# Patient Record
Sex: MALE | Race: WHITE | Employment: UNEMPLOYED | URBAN - METROPOLITAN AREA
[De-identification: names, ages, dates, MRNs, and addresses within clinical notes are randomized per-mention and may not be internally consistent; named-entity substitution may affect disease eponyms.]

---

## 2017-01-09 RX ORDER — PREDNISONE 10 MG/1
TABLET ORAL
Qty: 30 TABLET | Refills: 0 | Status: SHIPPED | OUTPATIENT
Start: 2017-01-09 | End: 2017-10-03

## 2017-01-23 ENCOUNTER — OFFICE VISIT (OUTPATIENT)
Dept: RHEUMATOLOGY | Age: 62
End: 2017-01-23

## 2017-01-23 VITALS
SYSTOLIC BLOOD PRESSURE: 124 MMHG | DIASTOLIC BLOOD PRESSURE: 86 MMHG | BODY MASS INDEX: 34.79 KG/M2 | HEIGHT: 70 IN | WEIGHT: 243 LBS | TEMPERATURE: 98.7 F | HEART RATE: 84 BPM

## 2017-01-23 DIAGNOSIS — M17.0 PRIMARY OSTEOARTHRITIS OF BOTH KNEES: ICD-10-CM

## 2017-01-23 DIAGNOSIS — Z79.899 HIGH RISK MEDICATION USE: ICD-10-CM

## 2017-01-23 DIAGNOSIS — L40.50 PSORIATIC ARTHRITIS (HCC): Primary | ICD-10-CM

## 2017-01-23 DIAGNOSIS — Z51.11 MAINTENANCE CHEMOTHERAPY: ICD-10-CM

## 2017-01-23 DIAGNOSIS — L40.9 PSORIASIS: ICD-10-CM

## 2017-01-23 LAB
A/G RATIO: 1.9 (ref 1.1–2.2)
ALBUMIN SERPL-MCNC: 4.4 G/DL (ref 3.4–5)
ALP BLD-CCNC: 57 U/L (ref 40–129)
ALT SERPL-CCNC: 21 U/L (ref 10–40)
ANION GAP SERPL CALCULATED.3IONS-SCNC: 15 MMOL/L (ref 3–16)
ANISOCYTOSIS: ABNORMAL
AST SERPL-CCNC: 14 U/L (ref 15–37)
BASOPHILS ABSOLUTE: 0.1 K/UL (ref 0–0.2)
BASOPHILS RELATIVE PERCENT: 1 %
BILIRUB SERPL-MCNC: 0.3 MG/DL (ref 0–1)
BUN BLDV-MCNC: 12 MG/DL (ref 7–20)
C-REACTIVE PROTEIN: 3.5 MG/L (ref 0–5.1)
CALCIUM SERPL-MCNC: 9.4 MG/DL (ref 8.3–10.6)
CHLORIDE BLD-SCNC: 101 MMOL/L (ref 99–110)
CO2: 27 MMOL/L (ref 21–32)
CREAT SERPL-MCNC: 0.8 MG/DL (ref 0.9–1.3)
EOSINOPHILS ABSOLUTE: 0.1 K/UL (ref 0–0.6)
EOSINOPHILS RELATIVE PERCENT: 1.7 %
GFR AFRICAN AMERICAN: >60
GFR NON-AFRICAN AMERICAN: >60
GLOBULIN: 2.3 G/DL
GLUCOSE BLD-MCNC: 91 MG/DL (ref 70–99)
HCT VFR BLD CALC: 48.1 % (ref 40.5–52.5)
HEMOGLOBIN: 15.7 G/DL (ref 13.5–17.5)
LYMPHOCYTES ABSOLUTE: 1.4 K/UL (ref 1–5.1)
LYMPHOCYTES RELATIVE PERCENT: 23.2 %
MCH RBC QN AUTO: 28.1 PG (ref 26–34)
MCHC RBC AUTO-ENTMCNC: 32.5 G/DL (ref 31–36)
MCV RBC AUTO: 86.4 FL (ref 80–100)
MONOCYTES ABSOLUTE: 0.6 K/UL (ref 0–1.3)
MONOCYTES RELATIVE PERCENT: 9.5 %
NEUTROPHILS ABSOLUTE: 4 K/UL (ref 1.7–7.7)
NEUTROPHILS RELATIVE PERCENT: 64.6 %
PDW BLD-RTO: 20.3 % (ref 12.4–15.4)
PLATELET # BLD: 175 K/UL (ref 135–450)
PLATELET SLIDE REVIEW: ADEQUATE
PMV BLD AUTO: 9 FL (ref 5–10.5)
POTASSIUM SERPL-SCNC: 5.1 MMOL/L (ref 3.5–5.1)
RBC # BLD: 5.57 M/UL (ref 4.2–5.9)
SEDIMENTATION RATE, ERYTHROCYTE: 8 MM/HR (ref 0–20)
SLIDE REVIEW: ABNORMAL
SODIUM BLD-SCNC: 143 MMOL/L (ref 136–145)
TOTAL PROTEIN: 6.7 G/DL (ref 6.4–8.2)
WBC # BLD: 6.2 K/UL (ref 4–11)

## 2017-01-23 PROCEDURE — 99214 OFFICE O/P EST MOD 30 MIN: CPT | Performed by: INTERNAL MEDICINE

## 2017-03-13 ENCOUNTER — TELEPHONE (OUTPATIENT)
Dept: RHEUMATOLOGY | Age: 62
End: 2017-03-13

## 2017-03-14 ENCOUNTER — TELEPHONE (OUTPATIENT)
Dept: RHEUMATOLOGY | Age: 62
End: 2017-03-14

## 2017-03-14 RX ORDER — PREDNISONE 10 MG/1
TABLET ORAL
Qty: 25 TABLET | Refills: 0 | Status: SHIPPED | OUTPATIENT
Start: 2017-03-14 | End: 2017-03-20

## 2017-03-20 ENCOUNTER — OFFICE VISIT (OUTPATIENT)
Dept: RHEUMATOLOGY | Age: 62
End: 2017-03-20

## 2017-03-20 VITALS
TEMPERATURE: 97.7 F | BODY MASS INDEX: 36.08 KG/M2 | HEIGHT: 70 IN | SYSTOLIC BLOOD PRESSURE: 134 MMHG | HEART RATE: 84 BPM | WEIGHT: 252 LBS | DIASTOLIC BLOOD PRESSURE: 86 MMHG

## 2017-03-20 DIAGNOSIS — L40.9 PSORIASIS: ICD-10-CM

## 2017-03-20 DIAGNOSIS — Z51.11 MAINTENANCE CHEMOTHERAPY: ICD-10-CM

## 2017-03-20 DIAGNOSIS — L40.50 PSORIATIC ARTHRITIS (HCC): Primary | ICD-10-CM

## 2017-03-20 LAB
A/G RATIO: 2 (ref 1.1–2.2)
ALBUMIN SERPL-MCNC: 4.3 G/DL (ref 3.4–5)
ALP BLD-CCNC: 62 U/L (ref 40–129)
ALT SERPL-CCNC: 48 U/L (ref 10–40)
ANION GAP SERPL CALCULATED.3IONS-SCNC: 18 MMOL/L (ref 3–16)
AST SERPL-CCNC: 23 U/L (ref 15–37)
BASOPHILS ABSOLUTE: 0.1 K/UL (ref 0–0.2)
BASOPHILS RELATIVE PERCENT: 1.2 %
BILIRUB SERPL-MCNC: 0.5 MG/DL (ref 0–1)
BUN BLDV-MCNC: 11 MG/DL (ref 7–20)
C-REACTIVE PROTEIN: 7.1 MG/L (ref 0–5.1)
CALCIUM SERPL-MCNC: 9.6 MG/DL (ref 8.3–10.6)
CHLORIDE BLD-SCNC: 103 MMOL/L (ref 99–110)
CO2: 23 MMOL/L (ref 21–32)
CREAT SERPL-MCNC: 0.7 MG/DL (ref 0.9–1.3)
EOSINOPHILS ABSOLUTE: 0.2 K/UL (ref 0–0.6)
EOSINOPHILS RELATIVE PERCENT: 1.8 %
GFR AFRICAN AMERICAN: >60
GFR NON-AFRICAN AMERICAN: >60
GLOBULIN: 2.1 G/DL
GLUCOSE BLD-MCNC: 98 MG/DL (ref 70–99)
HCT VFR BLD CALC: 49.4 % (ref 40.5–52.5)
HEMOGLOBIN: 16 G/DL (ref 13.5–17.5)
LYMPHOCYTES ABSOLUTE: 1.4 K/UL (ref 1–5.1)
LYMPHOCYTES RELATIVE PERCENT: 15.4 %
MCH RBC QN AUTO: 30.3 PG (ref 26–34)
MCHC RBC AUTO-ENTMCNC: 32.4 G/DL (ref 31–36)
MCV RBC AUTO: 93.5 FL (ref 80–100)
MONOCYTES ABSOLUTE: 0.8 K/UL (ref 0–1.3)
MONOCYTES RELATIVE PERCENT: 8.9 %
NEUTROPHILS ABSOLUTE: 6.6 K/UL (ref 1.7–7.7)
NEUTROPHILS RELATIVE PERCENT: 72.7 %
PDW BLD-RTO: 17.3 % (ref 12.4–15.4)
PLATELET # BLD: 209 K/UL (ref 135–450)
PMV BLD AUTO: 9.6 FL (ref 5–10.5)
POTASSIUM SERPL-SCNC: 4.4 MMOL/L (ref 3.5–5.1)
RBC # BLD: 5.28 M/UL (ref 4.2–5.9)
SEDIMENTATION RATE, ERYTHROCYTE: 5 MM/HR (ref 0–20)
SODIUM BLD-SCNC: 144 MMOL/L (ref 136–145)
TOTAL PROTEIN: 6.4 G/DL (ref 6.4–8.2)
WBC # BLD: 9.1 K/UL (ref 4–11)

## 2017-03-20 PROCEDURE — 99214 OFFICE O/P EST MOD 30 MIN: CPT | Performed by: INTERNAL MEDICINE

## 2017-03-20 RX ORDER — PREDNISONE 10 MG/1
TABLET ORAL
Qty: 30 TABLET | Refills: 1 | Status: SHIPPED | OUTPATIENT
Start: 2017-03-20 | End: 2017-03-30 | Stop reason: SDUPTHER

## 2017-03-30 RX ORDER — FOLIC ACID 1 MG/1
1 TABLET ORAL DAILY
Qty: 90 TABLET | Refills: 4 | Status: SHIPPED | OUTPATIENT
Start: 2017-03-30 | End: 2020-03-09

## 2017-03-30 RX ORDER — PREDNISONE 10 MG/1
TABLET ORAL
Qty: 30 TABLET | Refills: 1 | Status: SHIPPED | OUTPATIENT
Start: 2017-03-30 | End: 2017-06-30 | Stop reason: SDUPTHER

## 2017-05-08 ENCOUNTER — TELEPHONE (OUTPATIENT)
Dept: RHEUMATOLOGY | Age: 62
End: 2017-05-08

## 2017-05-19 DIAGNOSIS — L40.50 PSORIATIC ARTHROPATHY (HCC): Primary | ICD-10-CM

## 2017-05-22 ENCOUNTER — OFFICE VISIT (OUTPATIENT)
Dept: RHEUMATOLOGY | Age: 62
End: 2017-05-22

## 2017-05-22 VITALS
SYSTOLIC BLOOD PRESSURE: 134 MMHG | HEIGHT: 70 IN | WEIGHT: 250 LBS | TEMPERATURE: 98.5 F | DIASTOLIC BLOOD PRESSURE: 86 MMHG | BODY MASS INDEX: 35.79 KG/M2 | HEART RATE: 88 BPM

## 2017-05-22 DIAGNOSIS — Z51.11 MAINTENANCE CHEMOTHERAPY: ICD-10-CM

## 2017-05-22 DIAGNOSIS — L40.50 PSORIATIC ARTHROPATHY (HCC): Primary | ICD-10-CM

## 2017-05-22 DIAGNOSIS — S33.5XXA LUMBAR BACK SPRAIN, INITIAL ENCOUNTER: ICD-10-CM

## 2017-05-22 DIAGNOSIS — L40.9 PSORIASIS: ICD-10-CM

## 2017-05-22 LAB
ALBUMIN SERPL-MCNC: 4.2 G/DL (ref 3.4–5)
ALP BLD-CCNC: 50 U/L (ref 40–129)
ALT SERPL-CCNC: 33 U/L (ref 10–40)
AST SERPL-CCNC: 19 U/L (ref 15–37)
BASOPHILS ABSOLUTE: 0.1 K/UL (ref 0–0.2)
BASOPHILS RELATIVE PERCENT: 1 %
BILIRUB SERPL-MCNC: 0.3 MG/DL (ref 0–1)
BILIRUBIN DIRECT: <0.2 MG/DL (ref 0–0.3)
BILIRUBIN, INDIRECT: ABNORMAL MG/DL (ref 0–1)
C-REACTIVE PROTEIN: 2.5 MG/L (ref 0–5.1)
CREAT SERPL-MCNC: 0.7 MG/DL (ref 0.9–1.3)
EOSINOPHILS ABSOLUTE: 0.1 K/UL (ref 0–0.6)
EOSINOPHILS RELATIVE PERCENT: 2.2 %
GFR AFRICAN AMERICAN: >60
GFR NON-AFRICAN AMERICAN: >60
HCT VFR BLD CALC: 48 % (ref 40.5–52.5)
HEMOGLOBIN: 15.4 G/DL (ref 13.5–17.5)
LYMPHOCYTES ABSOLUTE: 1.5 K/UL (ref 1–5.1)
LYMPHOCYTES RELATIVE PERCENT: 24.2 %
MCH RBC QN AUTO: 30.7 PG (ref 26–34)
MCHC RBC AUTO-ENTMCNC: 32.2 G/DL (ref 31–36)
MCV RBC AUTO: 95.4 FL (ref 80–100)
MONOCYTES ABSOLUTE: 0.5 K/UL (ref 0–1.3)
MONOCYTES RELATIVE PERCENT: 8.6 %
NEUTROPHILS ABSOLUTE: 4 K/UL (ref 1.7–7.7)
NEUTROPHILS RELATIVE PERCENT: 64 %
PDW BLD-RTO: 15.8 % (ref 12.4–15.4)
PLATELET # BLD: 199 K/UL (ref 135–450)
PMV BLD AUTO: 9.6 FL (ref 5–10.5)
RBC # BLD: 5.03 M/UL (ref 4.2–5.9)
TOTAL PROTEIN: 6.1 G/DL (ref 6.4–8.2)
WBC # BLD: 6.2 K/UL (ref 4–11)

## 2017-05-22 PROCEDURE — 99214 OFFICE O/P EST MOD 30 MIN: CPT | Performed by: INTERNAL MEDICINE

## 2017-05-22 RX ORDER — HYDROCODONE BITARTRATE AND ACETAMINOPHEN 5; 325 MG/1; MG/1
TABLET ORAL
Qty: 21 TABLET | Refills: 0 | Status: SHIPPED | OUTPATIENT
Start: 2017-05-22

## 2017-05-22 RX ORDER — METHOCARBAMOL 500 MG/1
TABLET, FILM COATED ORAL
Qty: 30 TABLET | Refills: 0 | Status: SHIPPED | OUTPATIENT
Start: 2017-05-22 | End: 2017-06-29 | Stop reason: SDUPTHER

## 2017-06-15 ENCOUNTER — TELEPHONE (OUTPATIENT)
Dept: RHEUMATOLOGY | Age: 62
End: 2017-06-15

## 2017-06-21 ENCOUNTER — OFFICE VISIT (OUTPATIENT)
Dept: RHEUMATOLOGY | Age: 62
End: 2017-06-21

## 2017-06-21 VITALS
BODY MASS INDEX: 35.36 KG/M2 | HEART RATE: 76 BPM | TEMPERATURE: 98 F | SYSTOLIC BLOOD PRESSURE: 124 MMHG | DIASTOLIC BLOOD PRESSURE: 78 MMHG | WEIGHT: 247 LBS | HEIGHT: 70 IN

## 2017-06-21 DIAGNOSIS — L40.50 PSORIATIC ARTHROPATHY (HCC): ICD-10-CM

## 2017-06-21 DIAGNOSIS — L40.9 PSORIASIS: ICD-10-CM

## 2017-06-21 DIAGNOSIS — M17.0 PRIMARY OSTEOARTHRITIS OF BOTH KNEES: Primary | ICD-10-CM

## 2017-06-21 DIAGNOSIS — Z51.11 MAINTENANCE CHEMOTHERAPY: ICD-10-CM

## 2017-06-21 PROCEDURE — 20610 DRAIN/INJ JOINT/BURSA W/O US: CPT | Performed by: INTERNAL MEDICINE

## 2017-06-21 PROCEDURE — 99214 OFFICE O/P EST MOD 30 MIN: CPT | Performed by: INTERNAL MEDICINE

## 2017-06-21 RX ORDER — TRAMADOL HYDROCHLORIDE 50 MG/1
TABLET ORAL
Qty: 30 TABLET | Refills: 3 | Status: SHIPPED | OUTPATIENT
Start: 2017-06-21 | End: 2017-10-03

## 2017-06-21 RX ORDER — TRIAMCINOLONE ACETONIDE 40 MG/ML
40 INJECTION, SUSPENSION INTRA-ARTICULAR; INTRAMUSCULAR ONCE
Status: COMPLETED | OUTPATIENT
Start: 2017-06-21 | End: 2017-06-21

## 2017-06-21 RX ADMIN — TRIAMCINOLONE ACETONIDE 40 MG: 40 INJECTION, SUSPENSION INTRA-ARTICULAR; INTRAMUSCULAR at 08:16

## 2017-06-27 ENCOUNTER — TELEPHONE (OUTPATIENT)
Dept: RHEUMATOLOGY | Age: 62
End: 2017-06-27

## 2017-06-29 RX ORDER — METHOCARBAMOL 500 MG/1
TABLET, FILM COATED ORAL
Qty: 30 TABLET | Refills: 0 | Status: SHIPPED | OUTPATIENT
Start: 2017-06-29 | End: 2017-07-27 | Stop reason: SDUPTHER

## 2017-06-30 ENCOUNTER — TELEPHONE (OUTPATIENT)
Dept: RHEUMATOLOGY | Age: 62
End: 2017-06-30

## 2017-06-30 RX ORDER — PREDNISONE 10 MG/1
TABLET ORAL
Qty: 30 TABLET | Refills: 1 | Status: SHIPPED | OUTPATIENT
Start: 2017-06-30 | End: 2017-10-03

## 2017-07-12 ENCOUNTER — TELEPHONE (OUTPATIENT)
Dept: RHEUMATOLOGY | Age: 62
End: 2017-07-12

## 2017-07-13 ENCOUNTER — HOSPITAL ENCOUNTER (OUTPATIENT)
Dept: OTHER | Age: 62
Discharge: HOME | End: 2017-07-13
Attending: INTERNAL MEDICINE | Admitting: INTERNAL MEDICINE

## 2017-07-13 DIAGNOSIS — M17.0 PRIMARY OSTEOARTHRITIS OF BOTH KNEES: ICD-10-CM

## 2017-07-27 ENCOUNTER — TELEPHONE (OUTPATIENT)
Dept: RHEUMATOLOGY | Age: 62
End: 2017-07-27

## 2017-07-27 RX ORDER — METHOCARBAMOL 500 MG/1
TABLET, FILM COATED ORAL
Qty: 90 TABLET | Refills: 2 | Status: SHIPPED | OUTPATIENT
Start: 2017-07-27

## 2017-08-15 ENCOUNTER — TELEPHONE (OUTPATIENT)
Dept: RHEUMATOLOGY | Age: 62
End: 2017-08-15

## 2017-08-15 DIAGNOSIS — M17.0 PRIMARY OSTEOARTHRITIS OF BOTH KNEES: Primary | ICD-10-CM

## 2017-08-21 ENCOUNTER — OFFICE VISIT (OUTPATIENT)
Dept: ORTHOPEDIC SURGERY | Age: 62
End: 2017-08-21

## 2017-08-21 VITALS
BODY MASS INDEX: 35.07 KG/M2 | WEIGHT: 245 LBS | SYSTOLIC BLOOD PRESSURE: 190 MMHG | DIASTOLIC BLOOD PRESSURE: 90 MMHG | HEIGHT: 70 IN | HEART RATE: 65 BPM

## 2017-08-21 DIAGNOSIS — M17.10 ARTHRITIS OF KNEE: Primary | ICD-10-CM

## 2017-08-21 PROCEDURE — 20610 DRAIN/INJ JOINT/BURSA W/O US: CPT | Performed by: ORTHOPAEDIC SURGERY

## 2017-08-21 PROCEDURE — 99243 OFF/OP CNSLTJ NEW/EST LOW 30: CPT | Performed by: ORTHOPAEDIC SURGERY

## 2017-08-22 ENCOUNTER — TELEPHONE (OUTPATIENT)
Dept: FAMILY MEDICINE CLINIC | Age: 62
End: 2017-08-22

## 2017-10-02 ENCOUNTER — TELEPHONE (OUTPATIENT)
Dept: RHEUMATOLOGY | Age: 62
End: 2017-10-02

## 2017-10-02 NOTE — TELEPHONE ENCOUNTER
Last seen in 6/2017-  Cancelled 2 OVs.  Was supposed to taking Methotrexate, Humira and ? Prednisone from me, not sure what is taking . I do not know about radio med, there are many that are advertised. OV please.

## 2017-10-03 ENCOUNTER — OFFICE VISIT (OUTPATIENT)
Dept: RHEUMATOLOGY | Age: 62
End: 2017-10-03

## 2017-10-03 VITALS
BODY MASS INDEX: 34.22 KG/M2 | TEMPERATURE: 98.5 F | WEIGHT: 239 LBS | SYSTOLIC BLOOD PRESSURE: 126 MMHG | HEIGHT: 70 IN | DIASTOLIC BLOOD PRESSURE: 84 MMHG

## 2017-10-03 DIAGNOSIS — M25.462 SWELLING OF KNEE JOINT, LEFT: ICD-10-CM

## 2017-10-03 DIAGNOSIS — L40.50 PSORIATIC ARTHROPATHY (HCC): Primary | ICD-10-CM

## 2017-10-03 DIAGNOSIS — Z79.899 HIGH RISK MEDICATION USE: ICD-10-CM

## 2017-10-03 DIAGNOSIS — L40.9 PSORIASIS: ICD-10-CM

## 2017-10-03 LAB
ALBUMIN SERPL-MCNC: 4 G/DL (ref 3.4–5)
ALP BLD-CCNC: 57 U/L (ref 40–129)
ALT SERPL-CCNC: 19 U/L (ref 10–40)
AST SERPL-CCNC: 16 U/L (ref 15–37)
BASOPHILS ABSOLUTE: 0.1 K/UL (ref 0–0.2)
BASOPHILS RELATIVE PERCENT: 1.3 %
BILIRUB SERPL-MCNC: <0.2 MG/DL (ref 0–1)
BILIRUBIN DIRECT: <0.2 MG/DL (ref 0–0.3)
BILIRUBIN, INDIRECT: ABNORMAL MG/DL (ref 0–1)
C-REACTIVE PROTEIN: 2.9 MG/L (ref 0–5.1)
CREAT SERPL-MCNC: 0.7 MG/DL (ref 0.9–1.3)
EOSINOPHILS ABSOLUTE: 0.3 K/UL (ref 0–0.6)
EOSINOPHILS RELATIVE PERCENT: 5.6 %
GFR AFRICAN AMERICAN: >60
GFR NON-AFRICAN AMERICAN: >60
HCT VFR BLD CALC: 42.1 % (ref 40.5–52.5)
HEMOGLOBIN: 13.8 G/DL (ref 13.5–17.5)
LYMPHOCYTES ABSOLUTE: 1.3 K/UL (ref 1–5.1)
LYMPHOCYTES RELATIVE PERCENT: 28.5 %
MCH RBC QN AUTO: 28.6 PG (ref 26–34)
MCHC RBC AUTO-ENTMCNC: 32.7 G/DL (ref 31–36)
MCV RBC AUTO: 87.5 FL (ref 80–100)
MONOCYTES ABSOLUTE: 0.5 K/UL (ref 0–1.3)
MONOCYTES RELATIVE PERCENT: 10.9 %
NEUTROPHILS ABSOLUTE: 2.4 K/UL (ref 1.7–7.7)
NEUTROPHILS RELATIVE PERCENT: 53.7 %
PDW BLD-RTO: 14.5 % (ref 12.4–15.4)
PLATELET # BLD: 180 K/UL (ref 135–450)
PMV BLD AUTO: 9.8 FL (ref 5–10.5)
RBC # BLD: 4.81 M/UL (ref 4.2–5.9)
SEDIMENTATION RATE, ERYTHROCYTE: 6 MM/HR (ref 0–20)
TOTAL PROTEIN: 6.1 G/DL (ref 6.4–8.2)
WBC # BLD: 4.5 K/UL (ref 4–11)

## 2017-10-03 PROCEDURE — 20610 DRAIN/INJ JOINT/BURSA W/O US: CPT | Performed by: INTERNAL MEDICINE

## 2017-10-03 PROCEDURE — 99214 OFFICE O/P EST MOD 30 MIN: CPT | Performed by: INTERNAL MEDICINE

## 2017-10-04 RX ORDER — TRAMADOL HYDROCHLORIDE 50 MG/1
TABLET ORAL
Qty: 30 TABLET | Refills: 3 | OUTPATIENT
Start: 2017-10-04

## 2017-10-11 ENCOUNTER — TELEPHONE (OUTPATIENT)
Dept: RHEUMATOLOGY | Age: 62
End: 2017-10-11

## 2017-10-11 NOTE — TELEPHONE ENCOUNTER
Patient called to state he has sores in his mouth now . Doesn't know if the Humira caused it . Wanted to know what he can do.

## 2017-10-12 ENCOUNTER — TELEPHONE (OUTPATIENT)
Dept: RHEUMATOLOGY | Age: 62
End: 2017-10-12

## 2017-10-12 NOTE — TELEPHONE ENCOUNTER
He can try Folic acid that might help to heal along with Listerine. See PCP if sore is not getting better. It is NOT from Humira, or cream or Ibuprofen.

## 2017-10-17 DIAGNOSIS — L40.50 PSORIATIC ARTHROPATHY (HCC): ICD-10-CM

## 2017-10-17 RX ORDER — ADALIMUMAB 40MG/0.8ML
KIT SUBCUTANEOUS
Qty: 2 EACH | Refills: 1 | Status: SHIPPED | OUTPATIENT
Start: 2017-10-17 | End: 2018-01-12 | Stop reason: SDUPTHER

## 2017-10-23 ENCOUNTER — TELEPHONE (OUTPATIENT)
Dept: FAMILY MEDICINE CLINIC | Age: 62
End: 2017-10-23

## 2017-10-23 NOTE — TELEPHONE ENCOUNTER
Patient called the office and would like referral to pain mangt for his pain. Pt states he would like to go to the place located in [facility].. Please advise.  Pt last OV 10/20/16 for referral. See Encounter note

## 2017-10-24 NOTE — TELEPHONE ENCOUNTER
I am listed as PCP but I have never met this patient. Looks like he has seen Carlos Helton in the past. Please have him schedule office visit with me to establish or forward msg to Carlos Helton.

## 2017-11-13 ENCOUNTER — OFFICE VISIT (OUTPATIENT)
Dept: RHEUMATOLOGY | Age: 62
End: 2017-11-13

## 2017-11-13 VITALS
SYSTOLIC BLOOD PRESSURE: 138 MMHG | DIASTOLIC BLOOD PRESSURE: 84 MMHG | HEART RATE: 80 BPM | TEMPERATURE: 97.9 F | HEIGHT: 70 IN | WEIGHT: 237 LBS | BODY MASS INDEX: 33.93 KG/M2

## 2017-11-13 DIAGNOSIS — M25.562 PAIN AND SWELLING OF LEFT KNEE: ICD-10-CM

## 2017-11-13 DIAGNOSIS — L40.9 PSORIASIS: ICD-10-CM

## 2017-11-13 DIAGNOSIS — M25.462 PAIN AND SWELLING OF LEFT KNEE: ICD-10-CM

## 2017-11-13 DIAGNOSIS — Z79.899 HIGH RISK MEDICATION USE: ICD-10-CM

## 2017-11-13 DIAGNOSIS — L40.50 PSORIATIC ARTHROPATHY (HCC): Primary | ICD-10-CM

## 2017-11-13 PROCEDURE — 99214 OFFICE O/P EST MOD 30 MIN: CPT | Performed by: INTERNAL MEDICINE

## 2017-11-13 PROCEDURE — G8427 DOCREV CUR MEDS BY ELIG CLIN: HCPCS | Performed by: INTERNAL MEDICINE

## 2017-11-13 PROCEDURE — 3017F COLORECTAL CA SCREEN DOC REV: CPT | Performed by: INTERNAL MEDICINE

## 2017-11-13 PROCEDURE — 20610 DRAIN/INJ JOINT/BURSA W/O US: CPT | Performed by: INTERNAL MEDICINE

## 2017-11-13 PROCEDURE — G8417 CALC BMI ABV UP PARAM F/U: HCPCS | Performed by: INTERNAL MEDICINE

## 2017-11-13 PROCEDURE — G8484 FLU IMMUNIZE NO ADMIN: HCPCS | Performed by: INTERNAL MEDICINE

## 2017-11-13 PROCEDURE — 1036F TOBACCO NON-USER: CPT | Performed by: INTERNAL MEDICINE

## 2017-11-13 RX ORDER — TRIAMCINOLONE ACETONIDE 40 MG/ML
40 INJECTION, SUSPENSION INTRA-ARTICULAR; INTRAMUSCULAR ONCE
Status: COMPLETED | OUTPATIENT
Start: 2017-11-13 | End: 2017-11-13

## 2017-11-13 RX ADMIN — TRIAMCINOLONE ACETONIDE 40 MG: 40 INJECTION, SUSPENSION INTRA-ARTICULAR; INTRAMUSCULAR at 08:41

## 2017-11-13 NOTE — PROGRESS NOTES
[address] [place] [address] Avenue, [place]                                                           [address] [place], [place] [address]ø[facility], [address] [place] [address]                                                              (H) [phone] (F)      Dear Dr. Theodore Basurto MD:  Please find Rheumatology assessment. Thank you for giving me the opportunity to be involved in [address] [place] [address] care and I look forward following Josue Vitale along with you. If you have any questions or concerns please feel free to reach me. Note is transcribed using voice recognition software. Inadvertent computerized transcription errors may be present. Primary provider: Theodore Basurto MD  Patient identification: Arash Washington: [id number],45 y.o. Sex: male     Assessment:    Josue Vitale was seen today for joint pain. Diagnoses and all orders for this visit:    Psoriatic arthropathy ([facility] [address]    Psoriasis    High risk medication use    Pain and swelling of left knee  -     triamcinolone acetonide (KENALOG-40) injection 40 mg; Inject 1 mL into the muscle once  -     VA ARTHROCENTESIS ASPIR&/INJ MAJOR JT/BURSA W/O US        Psoriasis and psoriatic arthritis is in remission on Humira every 14 days. Continue Humira. Bilateral knee pain, knee swelling is secondary to underlying osteoarthritis. Previous joint fluid analysis showed noninflammatory fluid. Patient would like his left knee to be drained. Recommend MRI, and arthroscopic lavage especially left knee. Indication-left knee effusion. Procedure details: After explaining risk and benefits of the procedure and informed consent, skin was prepped with Chloroprep and anaesthetized with ethylene chloride spray.   Under sterile fashion,left knee joint was entered via superior lateral, left knee was entered (FOLVITE) 1 MG tablet Take 1 tablet by mouth daily Take 1 tab po daily. 90 tablet 4     No current facility-administered medications for this visit. Allergies   Allergen Reactions    Shellfish-Derived Products Anaphylaxis       PHYSICAL EXAM:    Vitals:    /84 (Site: Right Arm, Position: Sitting, Cuff Size: Large Adult)   Pulse 80   Temp 97.9 °F (36.6 °C) (Oral)   Ht 5' 10\" (1.778 m)   Wt 237 lb (107.5 kg)   BMI 34.01 kg/m²   General appearance: alert, appears stated age and cooperative. MKS:   28 joint JOINT COUNT:                               Right                   Left   Swell Tender Swell Tender   PIP1           PIP2        PIP3        PIP4          PIP5          MCP1           MCP2        MCP3        MCP4           MCP5           Wrist           Elbow           Shoulder          Knee Trace effusion. x  xx  x       Large  left knee swelling, not warm,  associated with painful range of motion. Swelling is out of proportion to pain. Otherwise, do not appreciate any tender, swollen or inflamed joints in upper or lower extremities. Normal gait and muscle strength. Skin:  No skin rashes. . No evidence ischemia or deformities noted in digits or nails. HEENT: Normal lids, lacrimal glands and pupils. No oral or nasal ulcers. Salivary glands reveal no evidence of abnormality. External inspection of the ears and nose within normal limits. Neurologic: normal deep tendon reflexes. No foot or wrist drop.           DATA:   Lab Results   Component Value Date    WBC 4.5 10/03/2017    HGB 13.8 10/03/2017    HCT 42.1 10/03/2017    MCV 87.5 10/03/2017     10/03/2017         Chemistry        Component Value Date/Time     03/20/2017 0903    K 4.4 03/20/2017 0903     03/20/2017 0903    CO2 23 03/20/2017 0903    BUN 11 03/20/2017 0903    CREATININE 0.7 (L) 10/03/2017 0934        Component Value Date/Time    CALCIUM 9.6 03/20/2017 0903    ALKPHOS 57 10/03/2017 0934    AST 16 10/03/2017 0934

## 2017-11-15 ENCOUNTER — TELEPHONE (OUTPATIENT)
Dept: ORTHOPEDIC SURGERY | Age: 62
End: 2017-11-15

## 2017-11-15 NOTE — TELEPHONE ENCOUNTER
SPOKE WITH THE PATIENT TODAY. HE IS HAVING SOME  JEANNINE. KNEE PAIN. HE SCHEDULED A FOLLOW UP APPOINTMENT WITH ME TO  SEE DR. Ball [address] ON 11/20/2017. PATIENT'S INJECTION GAVE HIM LITTLE RELIEF.

## 2017-11-16 ENCOUNTER — TELEPHONE (OUTPATIENT)
Dept: RHEUMATOLOGY | Age: 62
End: 2017-11-16

## 2017-11-16 NOTE — TELEPHONE ENCOUNTER
Pt called and stated that last night while cleaning up the dining room, he pulled a muscle in his back. It was a sharp, dull pain. Pt is requesting a short supply of Methocarbamol (muslce relaxer) be sent into pharmacy for a couple days to help with this.  The Rx has not been pended for approval.     Please Advise

## 2017-11-20 ENCOUNTER — OFFICE VISIT (OUTPATIENT)
Dept: ORTHOPEDIC SURGERY | Age: 62
End: 2017-11-20

## 2017-11-20 VITALS
BODY MASS INDEX: 33.64 KG/M2 | WEIGHT: 235 LBS | SYSTOLIC BLOOD PRESSURE: 170 MMHG | HEART RATE: 69 BPM | HEIGHT: 70 IN | DIASTOLIC BLOOD PRESSURE: 88 MMHG

## 2017-11-20 DIAGNOSIS — M23.91 INTERNAL DERANGEMENT OF RIGHT KNEE: ICD-10-CM

## 2017-11-20 DIAGNOSIS — M23.92 INTERNAL DERANGEMENT OF LEFT KNEE: ICD-10-CM

## 2017-11-20 DIAGNOSIS — L40.50 PSORIATIC ARTHRITIS (HCC): Primary | ICD-10-CM

## 2017-11-20 PROCEDURE — 1036F TOBACCO NON-USER: CPT | Performed by: ORTHOPAEDIC SURGERY

## 2017-11-20 PROCEDURE — G8484 FLU IMMUNIZE NO ADMIN: HCPCS | Performed by: ORTHOPAEDIC SURGERY

## 2017-11-20 PROCEDURE — G8427 DOCREV CUR MEDS BY ELIG CLIN: HCPCS | Performed by: ORTHOPAEDIC SURGERY

## 2017-11-20 PROCEDURE — G8417 CALC BMI ABV UP PARAM F/U: HCPCS | Performed by: ORTHOPAEDIC SURGERY

## 2017-11-20 PROCEDURE — 99213 OFFICE O/P EST LOW 20 MIN: CPT | Performed by: ORTHOPAEDIC SURGERY

## 2017-11-20 PROCEDURE — 3017F COLORECTAL CA SCREEN DOC REV: CPT | Performed by: ORTHOPAEDIC SURGERY

## 2017-11-20 NOTE — PROGRESS NOTES
an inflammatory synovitis which he has been treated medically for however, he also appears to have some mechanical irritation which may be related to a degenerative meniscus tear. Impression:  Encounter Diagnoses   Name Primary?  Psoriatic arthritis ([address] Yes    Internal derangement of left knee     Internal derangement of right knee          Treatment Plan:  I'm going to refer him for bilateral knee MRI studies to get a better evaluation of what potentially may be going on structurally. Depending on what this shows, we will likely consider further conservative care versus any other treatments. Any arthroscopic intervention would have to be weighed against back that he does have an inflammatory synovitis which potentially could persist.      Nicolette Francis MD  Orthopaedic Surgeon, Sports Medicine  Knee and Shoulder Surgery I Hip Arthroscopy I Joint Preservation  [address] [place] [address]    Date:    11/20/2017    This dictation was performed with a verbal recognition program (DRAGON) and it was checked for errors. It is possible that there are still dictated errors within this office note. If so, please bring any errors to my attention for an addendum. All efforts were made to ensure that this office note is accurate.

## 2017-12-27 ENCOUNTER — TELEPHONE (OUTPATIENT)
Dept: ORTHOPEDIC SURGERY | Age: 62
End: 2017-12-27

## 2018-01-02 ENCOUNTER — OFFICE VISIT (OUTPATIENT)
Dept: RHEUMATOLOGY | Age: 63
End: 2018-01-02

## 2018-01-02 VITALS
HEART RATE: 80 BPM | WEIGHT: 218 LBS | TEMPERATURE: 98.8 F | HEIGHT: 70 IN | BODY MASS INDEX: 31.21 KG/M2 | SYSTOLIC BLOOD PRESSURE: 118 MMHG | DIASTOLIC BLOOD PRESSURE: 84 MMHG

## 2018-01-02 DIAGNOSIS — L40.50 PSORIATIC ARTHROPATHY (HCC): Primary | ICD-10-CM

## 2018-01-02 DIAGNOSIS — Z79.899 HIGH RISK MEDICATION USE: ICD-10-CM

## 2018-01-02 DIAGNOSIS — L40.9 PSORIASIS: ICD-10-CM

## 2018-01-02 DIAGNOSIS — M17.0 PRIMARY OSTEOARTHRITIS OF BOTH KNEES: ICD-10-CM

## 2018-01-02 PROCEDURE — 99214 OFFICE O/P EST MOD 30 MIN: CPT | Performed by: INTERNAL MEDICINE

## 2018-01-02 RX ORDER — HYDROCODONE BITARTRATE AND ACETAMINOPHEN 5; 325 MG/1; MG/1
TABLET ORAL
Qty: 21 TABLET | Refills: 0 | Status: SHIPPED | OUTPATIENT
Start: 2018-01-02 | End: 2018-01-08

## 2018-01-02 NOTE — PROGRESS NOTES
records. #######################################################################    Znkdgzxzos-oxsqge-dz for psoriasis, psoriatic arthritis and osteoarthritis. He continues to have bilateral knee pain, intermittent swelling, stiffness, is followed by orthopedics. He had MRI knees, awaiting test results. States that pain is constant, worse with walking, weightbearing, takes 6-8 tablets of ibuprofen at once, 2-3 times a day. Even that, pain is not relieved. In terms of psoriatic arthritis and psoriasis, he is doing well, no joint flares. Skin is in remission. He had a minor skin flare in his facial area last month, resolved without any intervention. He is tolerating medications well without any intercurrent injection site reactions, infections, headache, cough or shortness of breath. All other review of systems are negative. PMH/PSH/FH/PS/ MEDS reviewed and unchanged. Father - Psorisis    Current Outpatient Prescriptions   Medication Sig Dispense Refill    HUMIRA PEN 40 MG/0.8ML injection INJECT ONE PEN (40 MG) SUBCUTANEOUSLY EVERY 14 DAYS 2 each 1    diclofenac (PENNSAID) 2 % SOLN Plan to painful area on knee twice daily as needed for pain. Do not use with ibuprofen or any other NSAIDs orally 1 Bottle 1    methocarbamol (ROBAXIN) 500 MG tablet Take 1 tab po TID 90 tablet 2    HYDROcodone-acetaminophen (NORCO) 5-325 MG per tablet Take 1 tab po TID/prn. 21 tablet 0    folic acid (FOLVITE) 1 MG tablet Take 1 tablet by mouth daily Take 1 tab po daily. 90 tablet 4     No current facility-administered medications for this visit. Allergies   Allergen Reactions    Shellfish-Derived Products Anaphylaxis       PHYSICAL EXAM:    Vitals:    /84 (Site: Right Arm, Position: Sitting, Cuff Size: Large Adult)   Pulse 80   Temp 98.8 °F (37.1 °C) (Oral)   Ht 5' 10\" (1.778 m)   Wt 218 lb (98.9 kg)   BMI 31.28 kg/m²   General appearance: alert, appears stated age and cooperative.    MKS:   28 joint JOINT COUNT:                               Right                   Left   Swell Tender Swell Tender   PIP1           PIP2        PIP3        PIP4          PIP5          MCP1           MCP2        MCP3        MCP4           MCP5           Wrist           Elbow           Shoulder          Knee Trace effusion. x  x  x     Bilateral knee effusion, not warm to touch, associated with audible crepitus bilaterally. Mild medial joint line tenderness. Normal alignment. Otherwise, do not appreciate any tender, swollen or inflamed joints in upper or lower extremities. Normal gait and muscle strength. She does not have any dactylitis or enthesitis. Skin:  No skin rashes. . No evidence ischemia or deformities noted in digits or nails. HEENT: Normal lids, lacrimal glands and pupils. No oral or nasal ulcers. Salivary glands reveal no evidence of abnormality. External inspection of the ears and nose within normal limits. Neurologic: normal deep tendon reflexes. No foot or wrist drop. DATA:   Lab Results   Component Value Date    WBC 4.5 10/03/2017    HGB 13.8 10/03/2017    HCT 42.1 10/03/2017    MCV 87.5 10/03/2017     10/03/2017         Chemistry        Component Value Date/Time     03/20/2017 0903    K 4.4 03/20/2017 0903     03/20/2017 0903    CO2 23 03/20/2017 0903    BUN 11 03/20/2017 0903    CREATININE 0.7 (L) 10/03/2017 0934        Component Value Date/Time    CALCIUM 9.6 03/20/2017 0903    ALKPHOS 57 10/03/2017 0934    AST 16 10/03/2017 0934    ALT 19 10/03/2017 0934    BILITOT <0.2 10/03/2017 0934            Lab Results   Component Value Date    CRP 2.9 10/03/2017     Lab Results   Component Value Date    SUPA Negative 10/10/2016    SEDRATE 6 10/03/2017     No results found for: CKTOTAL  Lab Results   Component Value Date    TSH 5.84 (H) 10/10/2016          A/P- See above.

## 2018-01-05 ENCOUNTER — OFFICE VISIT (OUTPATIENT)
Dept: ORTHOPEDIC SURGERY | Age: 63
End: 2018-01-05

## 2018-01-05 VITALS
DIASTOLIC BLOOD PRESSURE: 98 MMHG | WEIGHT: 218.03 LBS | BODY MASS INDEX: 31.21 KG/M2 | HEIGHT: 70 IN | HEART RATE: 72 BPM | SYSTOLIC BLOOD PRESSURE: 178 MMHG

## 2018-01-05 DIAGNOSIS — M17.0 PRIMARY OSTEOARTHRITIS OF BOTH KNEES: Primary | ICD-10-CM

## 2018-01-05 PROCEDURE — 1036F TOBACCO NON-USER: CPT | Performed by: ORTHOPAEDIC SURGERY

## 2018-01-05 PROCEDURE — G8484 FLU IMMUNIZE NO ADMIN: HCPCS | Performed by: ORTHOPAEDIC SURGERY

## 2018-01-05 PROCEDURE — 99214 OFFICE O/P EST MOD 30 MIN: CPT | Performed by: ORTHOPAEDIC SURGERY

## 2018-01-05 PROCEDURE — G8417 CALC BMI ABV UP PARAM F/U: HCPCS | Performed by: ORTHOPAEDIC SURGERY

## 2018-01-05 PROCEDURE — G8427 DOCREV CUR MEDS BY ELIG CLIN: HCPCS | Performed by: ORTHOPAEDIC SURGERY

## 2018-01-05 PROCEDURE — 3017F COLORECTAL CA SCREEN DOC REV: CPT | Performed by: ORTHOPAEDIC SURGERY

## 2018-01-05 PROCEDURE — 73562 X-RAY EXAM OF KNEE 3: CPT | Performed by: ORTHOPAEDIC SURGERY

## 2018-01-05 NOTE — PROGRESS NOTES
for pain. Do not use with ibuprofen or any other NSAIDs orally 1 Bottle 1    methocarbamol (ROBAXIN) 500 MG tablet Take 1 tab po TID 90 tablet 2    folic acid (FOLVITE) 1 MG tablet Take 1 tablet by mouth daily Take 1 tab po daily. 90 tablet 4     No current facility-administered medications on file prior to visit. Social History     Social History    Marital status: Single     Spouse name: N/A    Number of children: N/A    Years of education: N/A     Occupational History    Not on file. Social History Main Topics    Smoking status: Never Smoker    Smokeless tobacco: Never Used    Alcohol use Yes      Comment: occ    Drug use: Unknown    Sexual activity: Not on file     Other Topics Concern    Not on file     Social History Narrative    No narrative on file     No family history on file. Current Medications:    Current Outpatient Prescriptions   Medication Sig Dispense Refill    HYDROcodone-acetaminophen (NORCO) 5-325 MG per tablet Take 1 tab po TID/prn. 21 tablet 0    HUMIRA PEN 40 MG/0.8ML injection INJECT ONE PEN (40 MG) SUBCUTANEOUSLY EVERY 14 DAYS 2 each 1    HYDROcodone-acetaminophen (NORCO) 5-325 MG per tablet Take 1 tab po TID/prn. 21 tablet 0    diclofenac (PENNSAID) 2 % SOLN Plan to painful area on knee twice daily as needed for pain. Do not use with ibuprofen or any other NSAIDs orally 1 Bottle 1    methocarbamol (ROBAXIN) 500 MG tablet Take 1 tab po TID 90 tablet 2    folic acid (FOLVITE) 1 MG tablet Take 1 tablet by mouth daily Take 1 tab po daily. 90 tablet 4     No current facility-administered medications for this visit. Allergies: Allergies   Allergen Reactions    Shellfish-Derived Products Anaphylaxis       Physical Exam:  Vitals:    01/05/18 0821   BP: (!) 178/98   Pulse: 72     General: Jatinder Suarez is a 63y.o. year old male who is sitting comfortably in our office in no acute distress. Alert and oriented.       Objective:   Physical Exam  Vital be more related to the medial compartment failure and chondral wear than isolated issues    Impression:  Encounter Diagnosis   Name Primary?  Primary osteoarthritis of both knees Yes         Treatment Plan:  Given the severity of his endstage joint degeneration as visualized on both MRI and weightbearing knee x-rays, my recommendation would be to avoid any type of arthroscopic intervention as this likely would not be beneficial.  His treatment options are to continue with nonoperative modalities such as injections or consider a knee replacement given the severity of his arthritis, cartilage wear and joint degeneration. At the present time, he is interested in trying viscosupplementation. If this fails, we likely will have him see one of our colleagues for consideration of knee replacement      Lam Alicia MD  Orthopaedic Surgeon, Sports Medicine  Knee and Shoulder Surgery I Hip Arthroscopy I Joint Preservation  [address] [place] [address]    Date:    1/5/2018    This dictation was performed with a verbal recognition program (DRAGON) and it was checked for errors. It is possible that there are still dictated errors within this office note. If so, please bring any errors to my attention for an addendum. All efforts were made to ensure that this office note is accurate.

## 2018-01-09 ENCOUNTER — TELEPHONE (OUTPATIENT)
Dept: ORTHOPEDIC SURGERY | Age: 63
End: 2018-01-09

## 2018-01-12 DIAGNOSIS — L40.50 PSORIATIC ARTHROPATHY (HCC): ICD-10-CM

## 2018-01-12 RX ORDER — ADALIMUMAB 40MG/0.8ML
KIT SUBCUTANEOUS
Qty: 4 EACH | Refills: 3 | Status: SHIPPED | OUTPATIENT
Start: 2018-01-12 | End: 2018-08-22 | Stop reason: SDUPTHER

## 2018-01-17 ENCOUNTER — TELEPHONE (OUTPATIENT)
Dept: ORTHOPEDIC SURGERY | Age: 63
End: 2018-01-17

## 2018-01-18 ENCOUNTER — TELEPHONE (OUTPATIENT)
Dept: ORTHOPEDIC SURGERY | Age: 63
End: 2018-01-18

## 2018-01-22 ENCOUNTER — TELEPHONE (OUTPATIENT)
Dept: RHEUMATOLOGY | Age: 63
End: 2018-01-22

## 2018-01-22 NOTE — TELEPHONE ENCOUNTER
Patient called about his Humira Pen malfunction . He states he seen no blood and it didn't sting when he injected himself. He don't think he received the medication. He injected his self last Wednesday  Wanted to know if he could give himself another injection?

## 2018-01-26 ENCOUNTER — TELEPHONE (OUTPATIENT)
Dept: ORTHOPEDIC SURGERY | Age: 63
End: 2018-01-26

## 2018-01-26 ENCOUNTER — OFFICE VISIT (OUTPATIENT)
Dept: ORTHOPEDIC SURGERY | Age: 63
End: 2018-01-26

## 2018-01-26 VITALS
SYSTOLIC BLOOD PRESSURE: 214 MMHG | WEIGHT: 218.03 LBS | HEART RATE: 72 BPM | HEIGHT: 70 IN | BODY MASS INDEX: 31.21 KG/M2 | DIASTOLIC BLOOD PRESSURE: 89 MMHG

## 2018-01-26 DIAGNOSIS — M17.0 PRIMARY OSTEOARTHRITIS OF BOTH KNEES: Primary | ICD-10-CM

## 2018-01-26 PROCEDURE — 3017F COLORECTAL CA SCREEN DOC REV: CPT | Performed by: ORTHOPAEDIC SURGERY

## 2018-01-26 PROCEDURE — 1036F TOBACCO NON-USER: CPT | Performed by: ORTHOPAEDIC SURGERY

## 2018-01-26 PROCEDURE — 20610 DRAIN/INJ JOINT/BURSA W/O US: CPT | Performed by: ORTHOPAEDIC SURGERY

## 2018-01-26 PROCEDURE — G8484 FLU IMMUNIZE NO ADMIN: HCPCS | Performed by: ORTHOPAEDIC SURGERY

## 2018-01-26 PROCEDURE — G8427 DOCREV CUR MEDS BY ELIG CLIN: HCPCS | Performed by: ORTHOPAEDIC SURGERY

## 2018-01-26 PROCEDURE — 99213 OFFICE O/P EST LOW 20 MIN: CPT | Performed by: ORTHOPAEDIC SURGERY

## 2018-01-26 PROCEDURE — G8417 CALC BMI ABV UP PARAM F/U: HCPCS | Performed by: ORTHOPAEDIC SURGERY

## 2018-01-26 NOTE — PROGRESS NOTES
Luann Motta MD  Orthopaedic Surgeon, Sports Medicine  Knee and Shoulder Surgery I Hip Arthroscopy I Joint Preservation  [address] [place] [address]    Date:    1/26/2018    This dictation was performed with a verbal recognition program (DRAGON) and it was checked for errors. It is possible that there are still dictated errors within this office note. If so, please bring any errors to my attention for an addendum. All efforts were made to ensure that this office note is accurate.

## 2018-01-29 ENCOUNTER — TELEPHONE (OUTPATIENT)
Dept: ORTHOPEDIC SURGERY | Age: 63
End: 2018-01-29

## 2018-01-30 ENCOUNTER — TELEPHONE (OUTPATIENT)
Dept: ORTHOPEDIC SURGERY | Age: 63
End: 2018-01-30

## 2018-01-30 NOTE — TELEPHONE ENCOUNTER
Patient inquiring about getting another injection before [facility] He is aware that Dr. Ron Jacobs is out of the office until Friday but wonders if he is eligible for another injection would another doctor be able to administer it as a courtesy?  Please advise [phone]

## 2018-01-31 DIAGNOSIS — M17.0 PRIMARY OSTEOARTHRITIS OF BOTH KNEES: ICD-10-CM

## 2018-01-31 PROCEDURE — L1845 KO DOUBLE UPRIGHT PRE CST: HCPCS | Performed by: ORTHOPAEDIC SURGERY

## 2018-02-01 ENCOUNTER — OFFICE VISIT (OUTPATIENT)
Dept: ORTHOPEDIC SURGERY | Age: 63
End: 2018-02-01

## 2018-02-01 VITALS
DIASTOLIC BLOOD PRESSURE: 109 MMHG | HEART RATE: 67 BPM | SYSTOLIC BLOOD PRESSURE: 192 MMHG | BODY MASS INDEX: 31.21 KG/M2 | HEIGHT: 70 IN | WEIGHT: 218 LBS

## 2018-02-01 DIAGNOSIS — M17.0 PRIMARY OSTEOARTHRITIS OF BOTH KNEES: Primary | ICD-10-CM

## 2018-02-01 PROCEDURE — 20610 DRAIN/INJ JOINT/BURSA W/O US: CPT | Performed by: ORTHOPAEDIC SURGERY

## 2018-02-07 ENCOUNTER — TELEPHONE (OUTPATIENT)
Dept: ORTHOPEDIC SURGERY | Age: 63
End: 2018-02-07

## 2018-02-13 ENCOUNTER — OFFICE VISIT (OUTPATIENT)
Dept: ORTHOPEDIC SURGERY | Age: 63
End: 2018-02-13

## 2018-02-13 VITALS
WEIGHT: 218.03 LBS | BODY MASS INDEX: 31.21 KG/M2 | HEIGHT: 70 IN | SYSTOLIC BLOOD PRESSURE: 189 MMHG | HEART RATE: 61 BPM | DIASTOLIC BLOOD PRESSURE: 101 MMHG

## 2018-02-13 DIAGNOSIS — M17.0 PRIMARY OSTEOARTHRITIS OF BOTH KNEES: Primary | ICD-10-CM

## 2018-02-13 PROCEDURE — 20610 DRAIN/INJ JOINT/BURSA W/O US: CPT | Performed by: ORTHOPAEDIC SURGERY

## 2018-04-06 ENCOUNTER — OFFICE VISIT (OUTPATIENT)
Dept: RHEUMATOLOGY | Age: 63
End: 2018-04-06

## 2018-04-06 VITALS
TEMPERATURE: 98.7 F | SYSTOLIC BLOOD PRESSURE: 136 MMHG | BODY MASS INDEX: 34.07 KG/M2 | DIASTOLIC BLOOD PRESSURE: 80 MMHG | HEIGHT: 70 IN | WEIGHT: 238 LBS

## 2018-04-06 DIAGNOSIS — L40.50 PSORIATIC ARTHROPATHY (HCC): Primary | ICD-10-CM

## 2018-04-06 DIAGNOSIS — L40.9 PSORIASIS: ICD-10-CM

## 2018-04-06 DIAGNOSIS — Z79.899 HIGH RISK MEDICATION USE: ICD-10-CM

## 2018-04-06 DIAGNOSIS — M17.0 PRIMARY OSTEOARTHRITIS OF BOTH KNEES: ICD-10-CM

## 2018-04-06 DIAGNOSIS — Z79.1 NSAID LONG-TERM USE: ICD-10-CM

## 2018-04-06 LAB
A/G RATIO: 2 (ref 1.1–2.2)
ALBUMIN SERPL-MCNC: 4.3 G/DL (ref 3.4–5)
ALP BLD-CCNC: 65 U/L (ref 40–129)
ALT SERPL-CCNC: 17 U/L (ref 10–40)
ANION GAP SERPL CALCULATED.3IONS-SCNC: 14 MMOL/L (ref 3–16)
AST SERPL-CCNC: 14 U/L (ref 15–37)
BASOPHILS ABSOLUTE: 0.1 K/UL (ref 0–0.2)
BASOPHILS RELATIVE PERCENT: 1.2 %
BILIRUB SERPL-MCNC: <0.2 MG/DL (ref 0–1)
BUN BLDV-MCNC: 9 MG/DL (ref 7–20)
CALCIUM SERPL-MCNC: 8.9 MG/DL (ref 8.3–10.6)
CHLORIDE BLD-SCNC: 104 MMOL/L (ref 99–110)
CO2: 27 MMOL/L (ref 21–32)
CREAT SERPL-MCNC: 0.7 MG/DL (ref 0.8–1.3)
EOSINOPHILS ABSOLUTE: 0.3 K/UL (ref 0–0.6)
EOSINOPHILS RELATIVE PERCENT: 5 %
GFR AFRICAN AMERICAN: >60
GFR NON-AFRICAN AMERICAN: >60
GLOBULIN: 2.1 G/DL
GLUCOSE BLD-MCNC: 75 MG/DL (ref 70–99)
HCT VFR BLD CALC: 43.8 % (ref 40.5–52.5)
HEMOGLOBIN: 13.8 G/DL (ref 13.5–17.5)
LYMPHOCYTES ABSOLUTE: 1.6 K/UL (ref 1–5.1)
LYMPHOCYTES RELATIVE PERCENT: 29.8 %
MCH RBC QN AUTO: 24.7 PG (ref 26–34)
MCHC RBC AUTO-ENTMCNC: 31.4 G/DL (ref 31–36)
MCV RBC AUTO: 78.4 FL (ref 80–100)
MONOCYTES ABSOLUTE: 0.6 K/UL (ref 0–1.3)
MONOCYTES RELATIVE PERCENT: 11.4 %
NEUTROPHILS ABSOLUTE: 2.9 K/UL (ref 1.7–7.7)
NEUTROPHILS RELATIVE PERCENT: 52.6 %
PDW BLD-RTO: 17 % (ref 12.4–15.4)
PLATELET # BLD: 232 K/UL (ref 135–450)
PMV BLD AUTO: 9.9 FL (ref 5–10.5)
POTASSIUM SERPL-SCNC: 4.6 MMOL/L (ref 3.5–5.1)
RBC # BLD: 5.59 M/UL (ref 4.2–5.9)
SODIUM BLD-SCNC: 145 MMOL/L (ref 136–145)
TOTAL PROTEIN: 6.4 G/DL (ref 6.4–8.2)
WBC # BLD: 5.5 K/UL (ref 4–11)

## 2018-04-06 PROCEDURE — 1036F TOBACCO NON-USER: CPT | Performed by: INTERNAL MEDICINE

## 2018-04-06 PROCEDURE — 99214 OFFICE O/P EST MOD 30 MIN: CPT | Performed by: INTERNAL MEDICINE

## 2018-04-06 PROCEDURE — G8417 CALC BMI ABV UP PARAM F/U: HCPCS | Performed by: INTERNAL MEDICINE

## 2018-04-06 PROCEDURE — 3017F COLORECTAL CA SCREEN DOC REV: CPT | Performed by: INTERNAL MEDICINE

## 2018-04-06 PROCEDURE — G8427 DOCREV CUR MEDS BY ELIG CLIN: HCPCS | Performed by: INTERNAL MEDICINE

## 2018-04-11 ENCOUNTER — TELEPHONE (OUTPATIENT)
Dept: ORTHOPEDIC SURGERY | Age: 63
End: 2018-04-11

## 2018-04-17 ENCOUNTER — TELEPHONE (OUTPATIENT)
Dept: ORTHOPEDIC SURGERY | Age: 63
End: 2018-04-17

## 2018-07-06 ENCOUNTER — OFFICE VISIT (OUTPATIENT)
Dept: RHEUMATOLOGY | Age: 63
End: 2018-07-06

## 2018-07-06 VITALS
HEART RATE: 74 BPM | BODY MASS INDEX: 31.21 KG/M2 | WEIGHT: 218 LBS | HEIGHT: 70 IN | TEMPERATURE: 98 F | SYSTOLIC BLOOD PRESSURE: 126 MMHG | DIASTOLIC BLOOD PRESSURE: 82 MMHG

## 2018-07-06 DIAGNOSIS — L40.9 PSORIASIS: ICD-10-CM

## 2018-07-06 DIAGNOSIS — M17.0 PRIMARY OSTEOARTHRITIS OF BOTH KNEES: ICD-10-CM

## 2018-07-06 DIAGNOSIS — L40.50 PSORIATIC ARTHROPATHY (HCC): Primary | ICD-10-CM

## 2018-07-06 PROCEDURE — G8417 CALC BMI ABV UP PARAM F/U: HCPCS | Performed by: INTERNAL MEDICINE

## 2018-07-06 PROCEDURE — 3017F COLORECTAL CA SCREEN DOC REV: CPT | Performed by: INTERNAL MEDICINE

## 2018-07-06 PROCEDURE — 99214 OFFICE O/P EST MOD 30 MIN: CPT | Performed by: INTERNAL MEDICINE

## 2018-07-06 PROCEDURE — 1036F TOBACCO NON-USER: CPT | Performed by: INTERNAL MEDICINE

## 2018-07-06 PROCEDURE — G8427 DOCREV CUR MEDS BY ELIG CLIN: HCPCS | Performed by: INTERNAL MEDICINE

## 2018-07-06 NOTE — PROGRESS NOTES
MCP2        MCP3        MCP4           MCP5           Wrist           Elbow           Shoulder          knee            Wearing knee brace,normal alignment,  trace effusion in both knees, non tender, FROM. Otherwise, do not appreciate any tender, swollen or inflamed joints in upper or lower extremities. Normal gait and muscle strength. HE does not have any dactylitis or enthesitis. Skin:  No psoriatic skin rashes. . No evidence ischemia or deformities noted in digits or nails. HEENT: Normal lids, lacrimal glands and pupils. No oral or nasal ulcers. Salivary glands reveal no evidence of abnormality. External inspection of the ears and nose within normal limits. DATA:   Lab Results   Component Value Date    WBC 5.5 04/06/2018    HGB 13.8 04/06/2018    HCT 43.8 04/06/2018    MCV 78.4 (L) 04/06/2018     04/06/2018         Chemistry        Component Value Date/Time     04/06/2018 1028    K 4.6 04/06/2018 1028     04/06/2018 1028    CO2 27 04/06/2018 1028    BUN 9 04/06/2018 1028    CREATININE 0.7 (L) 04/06/2018 1028        Component Value Date/Time    CALCIUM 8.9 04/06/2018 1028    ALKPHOS 65 04/06/2018 1028    AST 14 (L) 04/06/2018 1028    ALT 17 04/06/2018 1028    BILITOT <0.2 04/06/2018 1028            Lab Results   Component Value Date    CRP 2.9 10/03/2017     Lab Results   Component Value Date    SUPA Negative 10/10/2016    SEDRATE 6 10/03/2017     No results found for: CKTOTAL  Lab Results   Component Value Date    TSH 5.84 (H) 10/10/2016          A/P- See above.

## 2018-08-22 DIAGNOSIS — L40.50 PSORIATIC ARTHROPATHY (HCC): ICD-10-CM

## 2018-08-22 RX ORDER — ADALIMUMAB 40MG/0.8ML
KIT SUBCUTANEOUS
Qty: 2 EACH | Refills: 2 | Status: SHIPPED | OUTPATIENT
Start: 2018-08-22 | End: 2018-11-16 | Stop reason: SDUPTHER

## 2018-11-16 DIAGNOSIS — L40.50 PSORIATIC ARTHROPATHY (HCC): ICD-10-CM

## 2018-11-16 RX ORDER — ADALIMUMAB 40MG/0.8ML
KIT SUBCUTANEOUS
Qty: 2 EACH | Refills: 0 | Status: SHIPPED | OUTPATIENT
Start: 2018-11-16 | End: 2018-12-13 | Stop reason: SDUPTHER

## 2018-12-03 ENCOUNTER — OFFICE VISIT (OUTPATIENT)
Dept: RHEUMATOLOGY | Age: 63
End: 2018-12-03
Payer: MEDICAID

## 2018-12-03 VITALS
TEMPERATURE: 99.2 F | DIASTOLIC BLOOD PRESSURE: 84 MMHG | SYSTOLIC BLOOD PRESSURE: 126 MMHG | HEIGHT: 70 IN | BODY MASS INDEX: 33.07 KG/M2 | WEIGHT: 231 LBS | HEART RATE: 86 BPM

## 2018-12-03 DIAGNOSIS — Z79.899 HIGH RISK MEDICATION USE: ICD-10-CM

## 2018-12-03 DIAGNOSIS — M17.0 PRIMARY OSTEOARTHRITIS OF BOTH KNEES: ICD-10-CM

## 2018-12-03 DIAGNOSIS — L40.9 PSORIASIS: ICD-10-CM

## 2018-12-03 DIAGNOSIS — L40.50 PSORIATIC ARTHROPATHY (HCC): Primary | ICD-10-CM

## 2018-12-03 LAB
ALBUMIN SERPL-MCNC: 4.6 G/DL (ref 3.4–5)
ALP BLD-CCNC: 65 U/L (ref 40–129)
ALT SERPL-CCNC: 20 U/L (ref 10–40)
AST SERPL-CCNC: 16 U/L (ref 15–37)
BASOPHILS ABSOLUTE: 0.1 K/UL (ref 0–0.2)
BASOPHILS RELATIVE PERCENT: 1.6 %
BILIRUB SERPL-MCNC: 0.3 MG/DL (ref 0–1)
BILIRUBIN DIRECT: <0.2 MG/DL (ref 0–0.3)
BILIRUBIN, INDIRECT: NORMAL MG/DL (ref 0–1)
C-REACTIVE PROTEIN: 1.3 MG/L (ref 0–5.1)
CREAT SERPL-MCNC: 0.8 MG/DL (ref 0.8–1.3)
EOSINOPHILS ABSOLUTE: 0.1 K/UL (ref 0–0.6)
EOSINOPHILS RELATIVE PERCENT: 2.5 %
GFR AFRICAN AMERICAN: >60
GFR NON-AFRICAN AMERICAN: >60
HCT VFR BLD CALC: 43.1 % (ref 40.5–52.5)
HEMOGLOBIN: 13.5 G/DL (ref 13.5–17.5)
LYMPHOCYTES ABSOLUTE: 1.2 K/UL (ref 1–5.1)
LYMPHOCYTES RELATIVE PERCENT: 22.7 %
MCH RBC QN AUTO: 23.2 PG (ref 26–34)
MCHC RBC AUTO-ENTMCNC: 31.4 G/DL (ref 31–36)
MCV RBC AUTO: 73.8 FL (ref 80–100)
MONOCYTES ABSOLUTE: 0.6 K/UL (ref 0–1.3)
MONOCYTES RELATIVE PERCENT: 12 %
NEUTROPHILS ABSOLUTE: 3.3 K/UL (ref 1.7–7.7)
NEUTROPHILS RELATIVE PERCENT: 61.2 %
PDW BLD-RTO: 17.5 % (ref 12.4–15.4)
PLATELET # BLD: 215 K/UL (ref 135–450)
PMV BLD AUTO: 9.4 FL (ref 5–10.5)
RBC # BLD: 5.84 M/UL (ref 4.2–5.9)
TOTAL PROTEIN: 7 G/DL (ref 6.4–8.2)
WBC # BLD: 5.4 K/UL (ref 4–11)

## 2018-12-03 PROCEDURE — 99214 OFFICE O/P EST MOD 30 MIN: CPT | Performed by: INTERNAL MEDICINE

## 2018-12-03 PROCEDURE — 1036F TOBACCO NON-USER: CPT | Performed by: INTERNAL MEDICINE

## 2018-12-03 PROCEDURE — 3017F COLORECTAL CA SCREEN DOC REV: CPT | Performed by: INTERNAL MEDICINE

## 2018-12-03 PROCEDURE — G8484 FLU IMMUNIZE NO ADMIN: HCPCS | Performed by: INTERNAL MEDICINE

## 2018-12-03 PROCEDURE — G8417 CALC BMI ABV UP PARAM F/U: HCPCS | Performed by: INTERNAL MEDICINE

## 2018-12-03 PROCEDURE — G8427 DOCREV CUR MEDS BY ELIG CLIN: HCPCS | Performed by: INTERNAL MEDICINE

## 2018-12-03 NOTE — PROGRESS NOTES
[address], [place]                                                           [id number] [place] [address]                                                             [phone] (Z) [phone] (F)      Dear  No primary care provider on file.:  Please find Rheumatology assessment. Thank you for giving me the opportunity to be involved in [address] [place] [address] care and I look forward following Mary Clark along with you. If you have any questions or concerns please feel free to reach me. Note is transcribed using voice recognition software. Inadvertent computerized transcription errors may be present. Primary provider: No primary care provider on file. Patient identification: Minerva oPrter: [birth year],28 y.o. Sex: male     Assessment:    Mary Clark was seen today for follow-up. Diagnoses and all orders for this visit:    Psoriatic arthropathy ([facility] [address]    Psoriasis    Primary osteoarthritis of both knees    High risk medication use  -     CBC Auto Differential; Future  -     Creatinine, Serum; Future  -     Hepatic Function Panel; Future  -     C-Reactive Protein; Future        Skin psoriasis and psoriatic arthritis-onset 2016-  Bilateral knee osteoarthritis-    Today's visit-  Psoriasis and psoriatic arthritis-in clinical remission on Humira 40 mg every 14 days. Knee osteoarthritis-worse-does not have diffuse and, using knee braces. Intra-articular injections did not help-steroid as well as Synvisc. Is not keen on getting knee replacement, inquiring about stem cell treatment. Plan-  He already had flu shot. Obtain Labs- to monitor disease and meds. Continue above Rx. Follow up in 4 months. Patient indicates understanding and agrees with the management plan.   I reviewed patient's

## 2018-12-13 DIAGNOSIS — L40.50 PSORIATIC ARTHROPATHY (HCC): ICD-10-CM

## 2018-12-13 RX ORDER — ADALIMUMAB 40MG/0.8ML
KIT SUBCUTANEOUS
Qty: 2 EACH | Refills: 3 | Status: SHIPPED | OUTPATIENT
Start: 2018-12-13 | End: 2018-12-19 | Stop reason: SDUPTHER

## 2018-12-19 DIAGNOSIS — L40.50 PSORIATIC ARTHROPATHY (HCC): ICD-10-CM

## 2019-01-03 DIAGNOSIS — L40.50 PSORIATIC ARTHROPATHY (HCC): ICD-10-CM

## 2019-04-01 ENCOUNTER — OFFICE VISIT (OUTPATIENT)
Dept: RHEUMATOLOGY | Age: 64
End: 2019-04-01
Payer: MEDICAID

## 2019-04-01 VITALS
BODY MASS INDEX: 33.07 KG/M2 | TEMPERATURE: 98.1 F | HEART RATE: 80 BPM | HEIGHT: 70 IN | WEIGHT: 231 LBS | DIASTOLIC BLOOD PRESSURE: 84 MMHG | SYSTOLIC BLOOD PRESSURE: 132 MMHG

## 2019-04-01 DIAGNOSIS — L40.50 PSORIATIC ARTHROPATHY (HCC): Primary | ICD-10-CM

## 2019-04-01 DIAGNOSIS — L40.9 PSORIASIS: ICD-10-CM

## 2019-04-01 DIAGNOSIS — M17.0 PRIMARY OSTEOARTHRITIS OF BOTH KNEES: ICD-10-CM

## 2019-04-01 DIAGNOSIS — Z79.899 HIGH RISK MEDICATION USE: ICD-10-CM

## 2019-04-01 PROCEDURE — G8417 CALC BMI ABV UP PARAM F/U: HCPCS | Performed by: INTERNAL MEDICINE

## 2019-04-01 PROCEDURE — G8427 DOCREV CUR MEDS BY ELIG CLIN: HCPCS | Performed by: INTERNAL MEDICINE

## 2019-04-01 PROCEDURE — 99214 OFFICE O/P EST MOD 30 MIN: CPT | Performed by: INTERNAL MEDICINE

## 2019-04-01 PROCEDURE — 1036F TOBACCO NON-USER: CPT | Performed by: INTERNAL MEDICINE

## 2019-04-01 PROCEDURE — 3017F COLORECTAL CA SCREEN DOC REV: CPT | Performed by: INTERNAL MEDICINE

## 2019-04-01 NOTE — PROGRESS NOTES
psoriasis and psoriatic arthritis, is pleased with Humira. However, continues to have bilateral knee pain from osteoarthritis, is able to manage with braces, ibuprofen, and rest.  He has bone-on-bone osteoarthritis, tried intra-articular steroid injection, viscous supplement, physical therapy without much benefit. Tolerating medications well. Denies intercurrent injection site reactions, infections, headache, cough or shortness of breath. All other review of systems are negative. PMH/PSH/FH/PS/ MEDS reviewed and updated as appropriate. Father - Psorisis    Current Outpatient Medications   Medication Sig Dispense Refill    HUMIRA PEN 40 MG/0.8ML injection INJECT THE CONTENTS OF 1 PEN (40 MG) UNDER THE SKIN EVERY 14 DAYS. REFRIGERATE. 2 each 3    diclofenac (PENNSAID) 2 % SOLN Plan to painful area on knee twice daily as needed for pain. Do not use with ibuprofen or any other NSAIDs orally 1 Bottle 1    methocarbamol (ROBAXIN) 500 MG tablet Take 1 tab po TID 90 tablet 2    HYDROcodone-acetaminophen (NORCO) 5-325 MG per tablet Take 1 tab po TID/prn. 21 tablet 0    folic acid (FOLVITE) 1 MG tablet Take 1 tablet by mouth daily Take 1 tab po daily. 90 tablet 4     No current facility-administered medications for this visit. Allergies   Allergen Reactions    Shellfish-Derived Products Anaphylaxis       PHYSICAL EXAM:    Vitals:    /84   Pulse 80   Temp 98.1 °F (36.7 °C) (Oral)   Ht 5' 10\" (1.778 m)   Wt 231 lb (104.8 kg)   BMI 33.15 kg/m²   General appearance: alert, appears stated age and cooperative.    MKS:   28 joint JOINT COUNT:                               Right                   Left   Swell Tender Swell Tender   PIP1           PIP2        PIP3        PIP4          PIP5          MCP1           MCP2        MCP3        MCP4           MCP5           Wrist           Elbow           Shoulder          knee            Other bony swelling, bony crepitus in his knees, and mild valgus deformities, I do not appreciate any tender, swollen or inflamed joints in upper or lower extremities. full range of motion in joints. No dactylitis or enthesitis. Normal gait and muscle strength. Skin: No psoriasis, rashes, No evidence ischemia or deformities noted in digits or nails. DATA:   Lab Results   Component Value Date    WBC 5.4 12/03/2018    HGB 13.5 12/03/2018    HCT 43.1 12/03/2018    MCV 73.8 (L) 12/03/2018     12/03/2018         Chemistry        Component Value Date/Time     04/06/2018 1028    K 4.6 04/06/2018 1028     04/06/2018 1028    CO2 27 04/06/2018 1028    BUN 9 04/06/2018 1028    CREATININE 0.8 12/03/2018 1249        Component Value Date/Time    CALCIUM 8.9 04/06/2018 1028    ALKPHOS 65 12/03/2018 1249    AST 16 12/03/2018 1249    ALT 20 12/03/2018 1249    BILITOT 0.3 12/03/2018 1249            Lab Results   Component Value Date    CRP 1.3 12/03/2018     Lab Results   Component Value Date    SUPA Negative 10/10/2016    SEDRATE 6 10/03/2017     No results found for: CKTOTAL  Lab Results   Component Value Date    TSH 5.84 (H) 10/10/2016          A/P- See above.

## 2019-05-29 DIAGNOSIS — L40.50 PSORIATIC ARTHROPATHY (HCC): ICD-10-CM

## 2019-05-29 RX ORDER — ADALIMUMAB 40MG/0.8ML
KIT SUBCUTANEOUS
Qty: 2 EACH | Refills: 3 | Status: SHIPPED | OUTPATIENT
Start: 2019-05-29 | End: 2019-09-09 | Stop reason: SDUPTHER

## 2019-07-10 ENCOUNTER — OFFICE VISIT (OUTPATIENT)
Dept: RHEUMATOLOGY | Age: 64
End: 2019-07-10
Payer: MEDICAID

## 2019-07-10 VITALS
SYSTOLIC BLOOD PRESSURE: 134 MMHG | BODY MASS INDEX: 31.64 KG/M2 | DIASTOLIC BLOOD PRESSURE: 82 MMHG | WEIGHT: 221 LBS | HEIGHT: 70 IN

## 2019-07-10 DIAGNOSIS — Z79.899 HIGH RISK MEDICATION USE: ICD-10-CM

## 2019-07-10 DIAGNOSIS — M72.2 PLANTAR FASCIITIS, LEFT: ICD-10-CM

## 2019-07-10 DIAGNOSIS — L40.9 PSORIASIS: ICD-10-CM

## 2019-07-10 DIAGNOSIS — L40.50 PSORIATIC ARTHRITIS (HCC): Primary | ICD-10-CM

## 2019-07-10 DIAGNOSIS — M15.9 GENERALIZED OSTEOARTHRITIS: ICD-10-CM

## 2019-07-10 LAB
A/G RATIO: 2 (ref 1.1–2.2)
ALBUMIN SERPL-MCNC: 4.5 G/DL (ref 3.4–5)
ALP BLD-CCNC: 63 U/L (ref 40–129)
ALT SERPL-CCNC: 30 U/L (ref 10–40)
ANION GAP SERPL CALCULATED.3IONS-SCNC: 14 MMOL/L (ref 3–16)
AST SERPL-CCNC: 27 U/L (ref 15–37)
BASOPHILS ABSOLUTE: 0.1 K/UL (ref 0–0.2)
BASOPHILS RELATIVE PERCENT: 1.4 %
BILIRUB SERPL-MCNC: <0.2 MG/DL (ref 0–1)
BUN BLDV-MCNC: 12 MG/DL (ref 7–20)
C-REACTIVE PROTEIN: 1.7 MG/L (ref 0–5.1)
CALCIUM SERPL-MCNC: 9.5 MG/DL (ref 8.3–10.6)
CHLORIDE BLD-SCNC: 102 MMOL/L (ref 99–110)
CO2: 24 MMOL/L (ref 21–32)
CREAT SERPL-MCNC: 0.7 MG/DL (ref 0.8–1.3)
EOSINOPHILS ABSOLUTE: 0.3 K/UL (ref 0–0.6)
EOSINOPHILS RELATIVE PERCENT: 6.3 %
GFR AFRICAN AMERICAN: >60
GFR NON-AFRICAN AMERICAN: >60
GLOBULIN: 2.2 G/DL
GLUCOSE BLD-MCNC: 108 MG/DL (ref 70–99)
HCT VFR BLD CALC: 43.1 % (ref 40.5–52.5)
HEMOGLOBIN: 14.1 G/DL (ref 13.5–17.5)
LYMPHOCYTES ABSOLUTE: 1.5 K/UL (ref 1–5.1)
LYMPHOCYTES RELATIVE PERCENT: 30.9 %
MCH RBC QN AUTO: 25.9 PG (ref 26–34)
MCHC RBC AUTO-ENTMCNC: 32.7 G/DL (ref 31–36)
MCV RBC AUTO: 79.4 FL (ref 80–100)
MONOCYTES ABSOLUTE: 0.4 K/UL (ref 0–1.3)
MONOCYTES RELATIVE PERCENT: 9.1 %
NEUTROPHILS ABSOLUTE: 2.5 K/UL (ref 1.7–7.7)
NEUTROPHILS RELATIVE PERCENT: 52.3 %
PDW BLD-RTO: 17.1 % (ref 12.4–15.4)
PLATELET # BLD: 196 K/UL (ref 135–450)
PMV BLD AUTO: 9.6 FL (ref 5–10.5)
POTASSIUM SERPL-SCNC: 4.8 MMOL/L (ref 3.5–5.1)
RBC # BLD: 5.43 M/UL (ref 4.2–5.9)
SEDIMENTATION RATE, ERYTHROCYTE: 7 MM/HR (ref 0–20)
SODIUM BLD-SCNC: 140 MMOL/L (ref 136–145)
TOTAL PROTEIN: 6.7 G/DL (ref 6.4–8.2)
WBC # BLD: 4.9 K/UL (ref 4–11)

## 2019-07-10 PROCEDURE — 3017F COLORECTAL CA SCREEN DOC REV: CPT | Performed by: INTERNAL MEDICINE

## 2019-07-10 PROCEDURE — 99214 OFFICE O/P EST MOD 30 MIN: CPT | Performed by: INTERNAL MEDICINE

## 2019-07-10 PROCEDURE — G8417 CALC BMI ABV UP PARAM F/U: HCPCS | Performed by: INTERNAL MEDICINE

## 2019-07-10 PROCEDURE — G8427 DOCREV CUR MEDS BY ELIG CLIN: HCPCS | Performed by: INTERNAL MEDICINE

## 2019-07-10 PROCEDURE — 1036F TOBACCO NON-USER: CPT | Performed by: INTERNAL MEDICINE

## 2019-07-10 NOTE — PATIENT INSTRUCTIONS
relax your calf muscles. You should feel a gentle stretch across the bottom of your foot and up the back of your leg to your knee. 3. Hold the stretch about 15 to 30 seconds, and then tighten your calf muscle a little to bring your heel back up to the level of the step. Repeat 2 to 4 times. Towel curls    1. While sitting, place your foot on a towel on the floor and scrunch the towel toward you with your toes. 2. Then, also using your toes, push the towel away from you. [place] pickups    1. Put marbles on the floor next to a cup.  2. Using your toes, try to lift the marbles up from the floor and put them in the cup. Follow-up care is a key part of your treatment and safety. Be sure to make and go to all appointments, and call your doctor if you are having problems. It's also a good idea to know your test results and keep a list of the medicines you take. Where can you learn more? Go to [email or web address] org and sign in to your [employer] account. Enter P834 in the Rise box to learn more about \"Plantar Fasciitis: Exercises. \"     If you do not have an account, please click on the \"Sign Up Now\" link. Current as of: September 20, 2018  Content Version: 12.0  © 1034-2914 [employer]. Care instructions adapted under license by [facility] ([facility]). If you have questions about a medical condition or this instruction, always ask your healthcare professional. [place] [address] any warranty or liability for your use of this information.

## 2019-09-09 DIAGNOSIS — L40.50 PSORIATIC ARTHROPATHY (HCC): ICD-10-CM

## 2019-09-09 RX ORDER — ADALIMUMAB 40MG/0.8ML
KIT SUBCUTANEOUS
Qty: 2 EACH | Refills: 2 | Status: SHIPPED | OUTPATIENT
Start: 2019-09-09 | End: 2022-06-02 | Stop reason: SDUPTHER

## 2019-11-04 ENCOUNTER — OFFICE VISIT (OUTPATIENT)
Dept: RHEUMATOLOGY | Age: 64
End: 2019-11-04
Payer: MEDICAID

## 2019-11-04 VITALS
BODY MASS INDEX: 31.5 KG/M2 | WEIGHT: 220 LBS | DIASTOLIC BLOOD PRESSURE: 84 MMHG | HEIGHT: 70 IN | SYSTOLIC BLOOD PRESSURE: 136 MMHG

## 2019-11-04 DIAGNOSIS — Z79.899 HIGH RISK MEDICATION USE: ICD-10-CM

## 2019-11-04 DIAGNOSIS — L40.50 PSORIATIC ARTHROPATHY (HCC): Primary | ICD-10-CM

## 2019-11-04 DIAGNOSIS — L40.9 PSORIASIS: ICD-10-CM

## 2019-11-04 DIAGNOSIS — M15.9 GENERALIZED OSTEOARTHRITIS: ICD-10-CM

## 2019-11-04 PROCEDURE — 3017F COLORECTAL CA SCREEN DOC REV: CPT | Performed by: INTERNAL MEDICINE

## 2019-11-04 PROCEDURE — 1036F TOBACCO NON-USER: CPT | Performed by: INTERNAL MEDICINE

## 2019-11-04 PROCEDURE — 99214 OFFICE O/P EST MOD 30 MIN: CPT | Performed by: INTERNAL MEDICINE

## 2019-11-04 PROCEDURE — G8427 DOCREV CUR MEDS BY ELIG CLIN: HCPCS | Performed by: INTERNAL MEDICINE

## 2019-11-04 PROCEDURE — G8484 FLU IMMUNIZE NO ADMIN: HCPCS | Performed by: INTERNAL MEDICINE

## 2019-11-04 PROCEDURE — G8417 CALC BMI ABV UP PARAM F/U: HCPCS | Performed by: INTERNAL MEDICINE

## 2019-11-04 RX ORDER — AMLODIPINE BESYLATE 10 MG/1
10 TABLET ORAL DAILY
COMMUNITY
Start: 2019-10-28

## 2019-12-02 ENCOUNTER — OFFICE VISIT (OUTPATIENT)
Dept: RHEUMATOLOGY | Age: 64
End: 2019-12-02
Payer: MEDICAID

## 2019-12-02 VITALS
HEIGHT: 70 IN | SYSTOLIC BLOOD PRESSURE: 130 MMHG | BODY MASS INDEX: 30.92 KG/M2 | WEIGHT: 216 LBS | DIASTOLIC BLOOD PRESSURE: 82 MMHG

## 2019-12-02 DIAGNOSIS — M17.0 PRIMARY OSTEOARTHRITIS OF BOTH KNEES: Primary | ICD-10-CM

## 2019-12-02 PROCEDURE — 20610 DRAIN/INJ JOINT/BURSA W/O US: CPT | Performed by: INTERNAL MEDICINE

## 2019-12-02 PROCEDURE — 99999 PR OFFICE/OUTPT VISIT,PROCEDURE ONLY: CPT | Performed by: INTERNAL MEDICINE

## 2019-12-02 RX ORDER — LIDOCAINE HYDROCHLORIDE 10 MG/ML
2 INJECTION, SOLUTION EPIDURAL; INFILTRATION; INTRACAUDAL; PERINEURAL ONCE
Status: COMPLETED | OUTPATIENT
Start: 2019-12-02 | End: 2019-12-02

## 2019-12-02 RX ADMIN — LIDOCAINE HYDROCHLORIDE 2 ML: 10 INJECTION, SOLUTION EPIDURAL; INFILTRATION; INTRACAUDAL; PERINEURAL at 07:54

## 2020-01-27 NOTE — TELEPHONE ENCOUNTER
Pt needs Rx sent to [facility] pharmacy now, apparently they changed back from [address] .  The Rx was pended for approval to go to [facility] pharmacy

## 2020-03-09 ENCOUNTER — OFFICE VISIT (OUTPATIENT)
Dept: RHEUMATOLOGY | Age: 65
End: 2020-03-09
Payer: MEDICAID

## 2020-03-09 VITALS
SYSTOLIC BLOOD PRESSURE: 124 MMHG | WEIGHT: 222 LBS | DIASTOLIC BLOOD PRESSURE: 84 MMHG | BODY MASS INDEX: 31.78 KG/M2 | HEIGHT: 70 IN

## 2020-03-09 LAB
A/G RATIO: 2 (ref 1.1–2.2)
ALBUMIN SERPL-MCNC: 4.2 G/DL (ref 3.4–5)
ALP BLD-CCNC: 60 U/L (ref 40–129)
ALT SERPL-CCNC: 16 U/L (ref 10–40)
ANION GAP SERPL CALCULATED.3IONS-SCNC: 13 MMOL/L (ref 3–16)
AST SERPL-CCNC: 17 U/L (ref 15–37)
BASOPHILS ABSOLUTE: 0.1 K/UL (ref 0–0.2)
BASOPHILS RELATIVE PERCENT: 1.2 %
BILIRUB SERPL-MCNC: 0.3 MG/DL (ref 0–1)
BUN BLDV-MCNC: 11 MG/DL (ref 7–20)
CALCIUM SERPL-MCNC: 9 MG/DL (ref 8.3–10.6)
CHLORIDE BLD-SCNC: 103 MMOL/L (ref 99–110)
CO2: 24 MMOL/L (ref 21–32)
CREAT SERPL-MCNC: 0.6 MG/DL (ref 0.8–1.3)
EOSINOPHILS ABSOLUTE: 0.3 K/UL (ref 0–0.6)
EOSINOPHILS RELATIVE PERCENT: 5.8 %
GFR AFRICAN AMERICAN: >60
GFR NON-AFRICAN AMERICAN: >60
GLOBULIN: 2.1 G/DL
GLUCOSE BLD-MCNC: 105 MG/DL (ref 70–99)
HCT VFR BLD CALC: 46 % (ref 40.5–52.5)
HEMOGLOBIN: 15.4 G/DL (ref 13.5–17.5)
LYMPHOCYTES ABSOLUTE: 1.5 K/UL (ref 1–5.1)
LYMPHOCYTES RELATIVE PERCENT: 25.7 %
MCH RBC QN AUTO: 30.1 PG (ref 26–34)
MCHC RBC AUTO-ENTMCNC: 33.4 G/DL (ref 31–36)
MCV RBC AUTO: 90 FL (ref 80–100)
MONOCYTES ABSOLUTE: 0.6 K/UL (ref 0–1.3)
MONOCYTES RELATIVE PERCENT: 10.9 %
NEUTROPHILS ABSOLUTE: 3.2 K/UL (ref 1.7–7.7)
NEUTROPHILS RELATIVE PERCENT: 56.4 %
PDW BLD-RTO: 14 % (ref 12.4–15.4)
PLATELET # BLD: 186 K/UL (ref 135–450)
PMV BLD AUTO: 9 FL (ref 5–10.5)
POTASSIUM SERPL-SCNC: 4.5 MMOL/L (ref 3.5–5.1)
RBC # BLD: 5.1 M/UL (ref 4.2–5.9)
SODIUM BLD-SCNC: 140 MMOL/L (ref 136–145)
TOTAL PROTEIN: 6.3 G/DL (ref 6.4–8.2)
WBC # BLD: 5.7 K/UL (ref 4–11)

## 2020-03-09 PROCEDURE — G8427 DOCREV CUR MEDS BY ELIG CLIN: HCPCS | Performed by: INTERNAL MEDICINE

## 2020-03-09 PROCEDURE — 99214 OFFICE O/P EST MOD 30 MIN: CPT | Performed by: INTERNAL MEDICINE

## 2020-03-09 PROCEDURE — G8484 FLU IMMUNIZE NO ADMIN: HCPCS | Performed by: INTERNAL MEDICINE

## 2020-03-09 PROCEDURE — 1036F TOBACCO NON-USER: CPT | Performed by: INTERNAL MEDICINE

## 2020-03-09 PROCEDURE — 36415 COLL VENOUS BLD VENIPUNCTURE: CPT | Performed by: INTERNAL MEDICINE

## 2020-03-09 PROCEDURE — G8417 CALC BMI ABV UP PARAM F/U: HCPCS | Performed by: INTERNAL MEDICINE

## 2020-03-09 PROCEDURE — 3017F COLORECTAL CA SCREEN DOC REV: CPT | Performed by: INTERNAL MEDICINE

## 2020-03-09 NOTE — PROGRESS NOTES
psoriatic arthritis and osteoarthritis. Interval changes-   He is doing well in terms of psoriasis and psoriatic arthritis on Humira. However continues to have bilateral knee pain with weightbearing, walking, has been taking ibuprofen as needed basis. Has tried intra-articular injections, therapy, knee replacement was advised, is holding off on getting surgery for now. Is tolerating medications well. No intercurrent infections, injection site reactions. All other review of systems are negative. PMH/PSH/FH/PS/ MEDS reviewed and updated as appropriate. Father - Psorisis    Current Outpatient Medications   Medication Sig Dispense Refill    Adalimumab (HUMIRA) 40 MG/0.4ML PSKT Inject 40 mg sc Q 14 days. 2 each 5    amLODIPine (NORVASC) 10 MG tablet Take 10 mg by mouth daily      HUMIRA PEN 40 MG/0.8ML injection INJECT ONE PEN SUBCUTANEOUSLY EVERY OTHER WEEK. REFRIGERATE. 2 each 2    diclofenac (PENNSAID) 2 % SOLN Plan to painful area on knee twice daily as needed for pain. Do not use with ibuprofen or any other NSAIDs orally 1 Bottle 1    methocarbamol (ROBAXIN) 500 MG tablet Take 1 tab po TID 90 tablet 2    HYDROcodone-acetaminophen (NORCO) 5-325 MG per tablet Take 1 tab po TID/prn. 21 tablet 0    folic acid (FOLVITE) 1 MG tablet Take 1 tablet by mouth daily Take 1 tab po daily. 90 tablet 4     No current facility-administered medications for this visit. Allergies   Allergen Reactions    Shellfish-Derived Products Anaphylaxis       PHYSICAL EXAM:    Vitals:    /84   Ht 5' 10\" (1.778 m)   Wt 222 lb (100.7 kg)   BMI 31.85 kg/m²   General appearance: alert, appears stated age and cooperative.    MKS:   28 joint JOINT COUNT:                               Right                   Left   Swell Tender Swell Tender   PIP1           PIP2        PIP3        PIP4          PIP5          MCP1           MCP2        MCP3        MCP4           MCP5           Wrist           Elbow           Shoulder

## 2020-06-22 ENCOUNTER — OFFICE VISIT (OUTPATIENT)
Dept: RHEUMATOLOGY | Age: 65
End: 2020-06-22
Payer: MEDICAID

## 2020-06-22 VITALS — TEMPERATURE: 98.3 F | WEIGHT: 222 LBS | HEIGHT: 70 IN | BODY MASS INDEX: 31.78 KG/M2

## 2020-06-22 PROCEDURE — G8417 CALC BMI ABV UP PARAM F/U: HCPCS | Performed by: INTERNAL MEDICINE

## 2020-06-22 PROCEDURE — G8427 DOCREV CUR MEDS BY ELIG CLIN: HCPCS | Performed by: INTERNAL MEDICINE

## 2020-06-22 PROCEDURE — 3017F COLORECTAL CA SCREEN DOC REV: CPT | Performed by: INTERNAL MEDICINE

## 2020-06-22 PROCEDURE — 1036F TOBACCO NON-USER: CPT | Performed by: INTERNAL MEDICINE

## 2020-06-22 PROCEDURE — 99214 OFFICE O/P EST MOD 30 MIN: CPT | Performed by: INTERNAL MEDICINE

## 2020-06-30 RX ORDER — ADALIMUMAB 40MG/0.4ML
KIT SUBCUTANEOUS
Qty: 2 EACH | Refills: 5 | Status: SHIPPED | OUTPATIENT
Start: 2020-06-30 | End: 2021-01-20

## 2020-10-26 ENCOUNTER — OFFICE VISIT (OUTPATIENT)
Dept: RHEUMATOLOGY | Age: 65
End: 2020-10-26
Payer: MEDICAID

## 2020-10-26 VITALS — BODY MASS INDEX: 31.78 KG/M2 | WEIGHT: 222 LBS | TEMPERATURE: 98.1 F | HEIGHT: 70 IN

## 2020-10-26 LAB
A/G RATIO: 2 (ref 1.1–2.2)
ALBUMIN SERPL-MCNC: 4.3 G/DL (ref 3.4–5)
ALP BLD-CCNC: 62 U/L (ref 40–129)
ALT SERPL-CCNC: 16 U/L (ref 10–40)
ANION GAP SERPL CALCULATED.3IONS-SCNC: 12 MMOL/L (ref 3–16)
AST SERPL-CCNC: 14 U/L (ref 15–37)
BASOPHILS ABSOLUTE: 0.1 K/UL (ref 0–0.2)
BASOPHILS RELATIVE PERCENT: 0.9 %
BILIRUB SERPL-MCNC: 0.5 MG/DL (ref 0–1)
BUN BLDV-MCNC: 11 MG/DL (ref 7–20)
C-REACTIVE PROTEIN: 1.9 MG/L (ref 0–5.1)
CALCIUM SERPL-MCNC: 8.8 MG/DL (ref 8.3–10.6)
CHLORIDE BLD-SCNC: 104 MMOL/L (ref 99–110)
CO2: 24 MMOL/L (ref 21–32)
CREAT SERPL-MCNC: 0.6 MG/DL (ref 0.8–1.3)
EOSINOPHILS ABSOLUTE: 0.3 K/UL (ref 0–0.6)
EOSINOPHILS RELATIVE PERCENT: 4.5 %
GFR AFRICAN AMERICAN: >60
GFR NON-AFRICAN AMERICAN: >60
GLOBULIN: 2.1 G/DL
GLUCOSE BLD-MCNC: 100 MG/DL (ref 70–99)
HCT VFR BLD CALC: 47.7 % (ref 40.5–52.5)
HEMOGLOBIN: 15.6 G/DL (ref 13.5–17.5)
LYMPHOCYTES ABSOLUTE: 1.4 K/UL (ref 1–5.1)
LYMPHOCYTES RELATIVE PERCENT: 22.6 %
MCH RBC QN AUTO: 29.3 PG (ref 26–34)
MCHC RBC AUTO-ENTMCNC: 32.7 G/DL (ref 31–36)
MCV RBC AUTO: 89.6 FL (ref 80–100)
MONOCYTES ABSOLUTE: 0.6 K/UL (ref 0–1.3)
MONOCYTES RELATIVE PERCENT: 9.2 %
NEUTROPHILS ABSOLUTE: 4 K/UL (ref 1.7–7.7)
NEUTROPHILS RELATIVE PERCENT: 62.8 %
PDW BLD-RTO: 14.2 % (ref 12.4–15.4)
PLATELET # BLD: 184 K/UL (ref 135–450)
PMV BLD AUTO: 9.8 FL (ref 5–10.5)
POTASSIUM SERPL-SCNC: 4.3 MMOL/L (ref 3.5–5.1)
RBC # BLD: 5.33 M/UL (ref 4.2–5.9)
SEDIMENTATION RATE, ERYTHROCYTE: 4 MM/HR (ref 0–20)
SODIUM BLD-SCNC: 140 MMOL/L (ref 136–145)
TOTAL PROTEIN: 6.4 G/DL (ref 6.4–8.2)
WBC # BLD: 6.4 K/UL (ref 4–11)

## 2020-10-26 PROCEDURE — G8484 FLU IMMUNIZE NO ADMIN: HCPCS | Performed by: INTERNAL MEDICINE

## 2020-10-26 PROCEDURE — 36415 COLL VENOUS BLD VENIPUNCTURE: CPT | Performed by: INTERNAL MEDICINE

## 2020-10-26 PROCEDURE — 99214 OFFICE O/P EST MOD 30 MIN: CPT | Performed by: INTERNAL MEDICINE

## 2020-10-26 PROCEDURE — G8417 CALC BMI ABV UP PARAM F/U: HCPCS | Performed by: INTERNAL MEDICINE

## 2020-10-26 PROCEDURE — 1036F TOBACCO NON-USER: CPT | Performed by: INTERNAL MEDICINE

## 2020-10-26 PROCEDURE — G8427 DOCREV CUR MEDS BY ELIG CLIN: HCPCS | Performed by: INTERNAL MEDICINE

## 2020-10-26 PROCEDURE — 3017F COLORECTAL CA SCREEN DOC REV: CPT | Performed by: INTERNAL MEDICINE

## 2020-10-26 NOTE — PATIENT INSTRUCTIONS
Patient Education        Thumb Arthritis: Exercises  Introduction  Here are some examples of exercises for you to try. The exercises may be suggested for a condition or for rehabilitation. Start each exercise slowly. Ease off the exercises if you start to have pain. You will be told when to start these exercises and which ones will work best for you. How to do the exercises  Thumb IP flexion   1. Place your forearm and hand on a table with your affected thumb pointing up. 2. With your other hand, hold your thumb steady just below the joint nearest your thumbnail. 3. Bend the tip of your thumb downward, then straighten it. 4. Repeat 8 to 12 times. 5. Switch hands and repeat steps 1 through 4, even if only one thumb is sore. Thumb MP flexion   1. Place your forearm and hand on a table with your affected thumb pointing up. 2. With your other hand, hold the base of your thumb and palm steady. 3. Bend your thumb downward where it meets your palm, then straighten it. 4. Repeat 8 to 12 times. 5. Switch hands and repeat steps 1 through 4, even if only one thumb is sore. Thumb opposition   1. With your affected hand, point your fingers and thumb straight up. Your wrist should be relaxed, following the line of your fingers and thumb. 2. Touch your affected thumb to each finger, one finger at a time. This will look like an \"okay\" sign, but try to keep your other fingers straight and pointing upward as much as you can. 3. Repeat 8 to 12 times. 4. Switch hands and repeat steps 1 through 3, even if only one thumb is sore. Follow-up care is a key part of your treatment and safety. Be sure to make and go to all appointments, and call your doctor if you are having problems. It's also a good idea to know your test results and keep a list of the medicines you take. Where can you learn more? Go to [email or web address] and sign in to your [employer] account.  Enter P192 in the [address] Information box to learn more about \"Thumb Arthritis: Exercises. \"     If you do not have an account, please click on the \"Sign Up Now\" link. Current as of: March 2, 2020               Content Version: 12.6  © 2006-2020 [employer]. Care instructions adapted under license by [facility] ([facility]). If you have questions about a medical condition or this instruction, always ask your healthcare professional. [address] any warranty or liability for your use of this information. Patient Education        Thumb Arthritis: Exercises  Introduction  Here are some examples of exercises for you to try. The exercises may be suggested for a condition or for rehabilitation. Start each exercise slowly. Ease off the exercises if you start to have pain. You will be told when to start these exercises and which ones will work best for you. How to do the exercises  Thumb IP flexion   6. Place your forearm and hand on a table with your affected thumb pointing up. 7. With your other hand, hold your thumb steady just below the joint nearest your thumbnail. 8. Bend the tip of your thumb downward, then straighten it. 9. Repeat 8 to 12 times. 10. Switch hands and repeat steps 1 through 4, even if only one thumb is sore. Thumb MP flexion   6. Place your forearm and hand on a table with your affected thumb pointing up. 7. With your other hand, hold the base of your thumb and palm steady. 8. Bend your thumb downward where it meets your palm, then straighten it. 9. Repeat 8 to 12 times. 10. Switch hands and repeat steps 1 through 4, even if only one thumb is sore. Thumb opposition   5. With your affected hand, point your fingers and thumb straight up. Your wrist should be relaxed, following the line of your fingers and thumb. 6. Touch your affected thumb to each finger, one finger at a time.  This will look like an \"okay\" sign, but try to keep your other fingers straight and pointing upward

## 2020-10-26 NOTE — PROGRESS NOTES
history, referral documents and electronic medical records. #######################################################################    Wwsfrtuqhv-sajbvd-yg for psoriasis, psoriatic arthritis and osteoarthritis. Interval changes-   He is doing well in terms of psoriasis and psoriatic arthritis however base of the thumb especially left CMC joint is bothering him, painful with use, especially twisting movement of the thumb. Knee pain is persistent from osteoarthritis, utilizes Tylenol and ibuprofen for pain control. Denies any swollen or inflamed joints in the upper or lower extremities. Overall, pleased in terms of inflammatory arthritis however osteoarthritis remains bothersome. He is tolerating medications well. No infections, injection site reactions, cough or shortness of breath. No rashes. All other review of systems are negative. PMH/PSH/FH/PS/ MEDS reviewed and updated as appropriate. Father - Psorisis    Current Outpatient Medications   Medication Sig Dispense Refill    diclofenac sodium (VOLTAREN) 1 % GEL Apply 2 g topically 3 times daily as needed for Pain 1 Tube 0    Adalimumab (HUMIRA) 40 MG/0.4ML PSKT Inject 40 mg sc Q 14 days. 2 each 5    amLODIPine (NORVASC) 10 MG tablet Take 10 mg by mouth daily      HUMIRA PEN 40 MG/0.8ML injection INJECT ONE PEN SUBCUTANEOUSLY EVERY OTHER WEEK. REFRIGERATE. 2 each 2    diclofenac (PENNSAID) 2 % SOLN Plan to painful area on knee twice daily as needed for pain. Do not use with ibuprofen or any other NSAIDs orally 1 Bottle 1    methocarbamol (ROBAXIN) 500 MG tablet Take 1 tab po TID 90 tablet 2    HYDROcodone-acetaminophen (NORCO) 5-325 MG per tablet Take 1 tab po TID/prn. 21 tablet 0    folic acid (FOLVITE) 1 MG tablet Take 1 tablet by mouth daily Take 1 tab po daily. 90 tablet 4     No current facility-administered medications for this visit.       Allergies   Allergen Reactions    Shellfish-Derived Products Anaphylaxis       PHYSICAL EXAM:

## 2021-01-20 DIAGNOSIS — L40.50 PSORIATIC ARTHRITIS (HCC): ICD-10-CM

## 2021-01-20 RX ORDER — ADALIMUMAB 40MG/0.4ML
KIT SUBCUTANEOUS
Qty: 2 EACH | Refills: 4 | Status: SHIPPED | OUTPATIENT
Start: 2021-01-20 | End: 2021-11-01 | Stop reason: ALTCHOICE

## 2021-01-25 ENCOUNTER — VIRTUAL VISIT (OUTPATIENT)
Dept: RHEUMATOLOGY | Age: 66
End: 2021-01-25
Payer: MEDICAID

## 2021-01-25 DIAGNOSIS — M15.9 GENERALIZED OSTEOARTHRITIS: ICD-10-CM

## 2021-01-25 DIAGNOSIS — L40.50 PSORIATIC ARTHRITIS (HCC): Primary | ICD-10-CM

## 2021-01-25 DIAGNOSIS — Z79.899 HIGH RISK MEDICATION USE: ICD-10-CM

## 2021-01-25 DIAGNOSIS — L40.9 PSORIASIS: ICD-10-CM

## 2021-01-25 PROCEDURE — 99214 OFFICE O/P EST MOD 30 MIN: CPT | Performed by: INTERNAL MEDICINE

## 2021-01-25 PROCEDURE — G8427 DOCREV CUR MEDS BY ELIG CLIN: HCPCS | Performed by: INTERNAL MEDICINE

## 2021-01-25 PROCEDURE — 3017F COLORECTAL CA SCREEN DOC REV: CPT | Performed by: INTERNAL MEDICINE

## 2021-07-28 ENCOUNTER — OFFICE VISIT (OUTPATIENT)
Dept: RHEUMATOLOGY | Age: 66
End: 2021-07-28
Payer: MEDICAID

## 2021-07-28 VITALS
DIASTOLIC BLOOD PRESSURE: 98 MMHG | BODY MASS INDEX: 31.21 KG/M2 | HEIGHT: 70 IN | SYSTOLIC BLOOD PRESSURE: 164 MMHG | WEIGHT: 218 LBS

## 2021-07-28 DIAGNOSIS — L40.50 PSORIATIC ARTHRITIS (HCC): Primary | ICD-10-CM

## 2021-07-28 DIAGNOSIS — L40.9 PSORIASIS: ICD-10-CM

## 2021-07-28 DIAGNOSIS — R21 RASH: ICD-10-CM

## 2021-07-28 DIAGNOSIS — M15.9 GENERALIZED OSTEOARTHRITIS: ICD-10-CM

## 2021-07-28 PROCEDURE — 99214 OFFICE O/P EST MOD 30 MIN: CPT | Performed by: INTERNAL MEDICINE

## 2021-07-28 PROCEDURE — 1036F TOBACCO NON-USER: CPT | Performed by: INTERNAL MEDICINE

## 2021-07-28 PROCEDURE — G8417 CALC BMI ABV UP PARAM F/U: HCPCS | Performed by: INTERNAL MEDICINE

## 2021-07-28 PROCEDURE — 3017F COLORECTAL CA SCREEN DOC REV: CPT | Performed by: INTERNAL MEDICINE

## 2021-07-28 PROCEDURE — G8427 DOCREV CUR MEDS BY ELIG CLIN: HCPCS | Performed by: INTERNAL MEDICINE

## 2021-07-28 NOTE — PROGRESS NOTES
[address], [place]                                                           [address] [place] [address] [address] [place] [address], [address] (I) [phone] (F)      Dear Dr. Mariano Reyna MD:  Please find Rheumatology assessment. Thank you for giving me the opportunity to be involved in [address] [place] [address] care and I look forward following Manasa Flowers along with you. If you have any questions or concerns please feel free to reach me. Note is transcribed using voice recognition software. Inadvertent computerized transcription errors may be present. Primary provider: Mariano Reyna MD  Patient identification: Ladan Gabriel: [id number],30 y.o. Sex: male     Assessment:      Manasa Flowers was seen today for follow-up. Diagnoses and all orders for this visit:    Psoriatic arthritis ([address]    Psoriasis    Generalized osteoarthritis    Rash    Other orders  -     adalimumab (HUMIRA PEN) 40 MG/0.8ML injection; Inject 40 mg sc Q 14 days        Skin psoriasis and psoriatic arthritis-onset 2016. Today's visit-  Psoriasis and psoriatic arthritis-doing well other than a few psoriatic lesions in his neck. Is on Humira 40 mg every other week. Has been using topical clobetasol for skin psoriasis. Knee osteoarthritis-stable on diclofenac gel and acetaminophen. Uses knee brace as needed tried and failed intra-articular Visco and steroid. Is not ready for surgical intervention. Plan-safety labs are normal-reviewed from care everywhere 5/19/2021, continue current medications. He prefers  Citrate containing Humira, prescription changed. Patient indicates understanding and agrees with the management plan.   I reviewed patient's history, referral documents and electronic medical records. #######################################################################    Mnvlhxhvfl-knhrep-ia for psoriasis, psoriatic arthritis and osteoarthritis. Interval changes-   Other than to psoriatic skin patches in his neck, is doing well in terms of psoriasis. Continues to have knee discomfort from osteoarthritis but psoriatic arthritis is in remission. No swollen, inflamed joints. Denies dactylitis or enthesitis. Tolerating medications well. No infections, injection site reactions, cough or shortness of breath. No rashes. All other review of systems are negative. PMH/PSH/FH/PS/ MEDS reviewed and updated as appropriate. Father - Psorisis    Current Outpatient Medications   Medication Sig Dispense Refill    adalimumab (HUMIRA PEN) 40 MG/0.8ML injection Inject 40 mg sc Q 14 days 2 each 4    Adalimumab (HUMIRA PEN) 40 MG/0.4ML PNKT INJECT 1 PEN SUBCUTANEOUSLY EVERY 14 DAYS. 2 each 4    diclofenac sodium (VOLTAREN) 1 % GEL Apply 2 g topically 3 times daily as needed for Pain 1 Tube 0    amLODIPine (NORVASC) 10 MG tablet Take 10 mg by mouth daily      HUMIRA PEN 40 MG/0.8ML injection INJECT ONE PEN SUBCUTANEOUSLY EVERY OTHER WEEK. REFRIGERATE. 2 each 2    diclofenac (PENNSAID) 2 % SOLN Plan to painful area on knee twice daily as needed for pain. Do not use with ibuprofen or any other NSAIDs orally 1 Bottle 1    methocarbamol (ROBAXIN) 500 MG tablet Take 1 tab po TID 90 tablet 2    HYDROcodone-acetaminophen (NORCO) 5-325 MG per tablet Take 1 tab po TID/prn. 21 tablet 0    folic acid (FOLVITE) 1 MG tablet Take 1 tablet by mouth daily Take 1 tab po daily. 90 tablet 4     No current facility-administered medications for this visit.      Allergies   Allergen Reactions    Shellfish-Derived Products Anaphylaxis       PHYSICAL EXAM:    Vitals:    BP (!) 164/98   Ht 5' 10\" (1.778 m)   Wt 218 lb (98.9 kg)   BMI 31.28 kg/m²   General appearance: alert, appears stated age and cooperative. MKS:   28 joint JOINT COUNT:                               Right                   Left   Swell Tender Swell Tender   PIP1           PIP2        PIP3        PIP4          PIP5          MCP1           MCP2        MCP3        MCP4           MCP5           Wrist           Elbow           Shoulder          knee              OA changes in his knees-does not have any tender swollen or inflamed joints in upper or lower extremities, F ROM in all peripheral joints. Skin: 2 small psoriatic lesions in his neck. Otherwise no rashes or nail pitting. DATA:   Lab Results   Component Value Date    WBC 6.4 10/26/2020    HGB 15.6 10/26/2020    HCT 47.7 10/26/2020    MCV 89.6 10/26/2020     10/26/2020         Chemistry        Component Value Date/Time     10/26/2020 1028    K 4.3 10/26/2020 1028     10/26/2020 1028    CO2 24 10/26/2020 1028    BUN 11 10/26/2020 1028    CREATININE 0.6 (L) 10/26/2020 1028        Component Value Date/Time    CALCIUM 8.8 10/26/2020 1028    ALKPHOS 62 10/26/2020 1028    AST 14 (L) 10/26/2020 1028    ALT 16 10/26/2020 1028    BILITOT 0.5 10/26/2020 1028            Lab Results   Component Value Date    CRP 1.9 10/26/2020     Lab Results   Component Value Date    SUPA Negative 10/10/2016    SEDRATE 4 10/26/2020     No results found for: CKTOTAL  Lab Results   Component Value Date    TSH 5.84 (H) 10/10/2016          Total time 32 minutes that includes the following-  Preparing to see the patient such as reviewing patients records, pre-charting, preparing the visit on the same day, performing a medically appropriate history and physical examination, counseling and educating patient about diagnosis, management plan, ordering appropriate testings, prescriptions, communicating findings to other care providers, and documenting clinical information in electronic medical record. A/P- See above.

## 2021-08-11 ENCOUNTER — TELEPHONE (OUTPATIENT)
Dept: RHEUMATOLOGY | Age: 66
End: 2021-08-11

## 2021-08-11 NOTE — TELEPHONE ENCOUNTER
[place] COVER MY MEDS  Medication:Humira Pen 40MG/0.8ML pen-injector kit  Key:[id number] - [place] Case ID: [id number]  Status:PENDING          Authorization Status:APPROVAL  Authorization Number:[place] Case ID: [id number]  Approved : Humira Pen 40MG/0.8ML pen-injector kit  Date Span:  Start-08/11/2021  End-08/11/2022

## 2021-11-01 ENCOUNTER — OFFICE VISIT (OUTPATIENT)
Dept: RHEUMATOLOGY | Age: 66
End: 2021-11-01
Payer: MEDICAID

## 2021-11-01 VITALS
DIASTOLIC BLOOD PRESSURE: 80 MMHG | SYSTOLIC BLOOD PRESSURE: 128 MMHG | HEIGHT: 70 IN | WEIGHT: 218 LBS | BODY MASS INDEX: 31.21 KG/M2

## 2021-11-01 DIAGNOSIS — Z79.899 HIGH RISK MEDICATION USE: ICD-10-CM

## 2021-11-01 DIAGNOSIS — L40.9 PSORIASIS: ICD-10-CM

## 2021-11-01 DIAGNOSIS — L40.50 PSORIATIC ARTHRITIS (HCC): Primary | ICD-10-CM

## 2021-11-01 DIAGNOSIS — M15.9 GENERALIZED OSTEOARTHRITIS: ICD-10-CM

## 2021-11-01 LAB
ALT SERPL-CCNC: 26 U/L (ref 10–40)
AST SERPL-CCNC: 22 U/L (ref 15–37)
BASOPHILS ABSOLUTE: 0.1 K/UL (ref 0–0.2)
BASOPHILS RELATIVE PERCENT: 1.3 %
C-REACTIVE PROTEIN: 3.5 MG/L (ref 0–5.1)
CREAT SERPL-MCNC: 0.7 MG/DL (ref 0.8–1.3)
EOSINOPHILS ABSOLUTE: 0.4 K/UL (ref 0–0.6)
EOSINOPHILS RELATIVE PERCENT: 7.2 %
GFR AFRICAN AMERICAN: >60
GFR NON-AFRICAN AMERICAN: >60
HCT VFR BLD CALC: 43.5 % (ref 40.5–52.5)
HEMOGLOBIN: 14.1 G/DL (ref 13.5–17.5)
LYMPHOCYTES ABSOLUTE: 1.4 K/UL (ref 1–5.1)
LYMPHOCYTES RELATIVE PERCENT: 25.7 %
MCH RBC QN AUTO: 26.1 PG (ref 26–34)
MCHC RBC AUTO-ENTMCNC: 32.4 G/DL (ref 31–36)
MCV RBC AUTO: 80.6 FL (ref 80–100)
MONOCYTES ABSOLUTE: 0.6 K/UL (ref 0–1.3)
MONOCYTES RELATIVE PERCENT: 10.6 %
NEUTROPHILS ABSOLUTE: 3 K/UL (ref 1.7–7.7)
NEUTROPHILS RELATIVE PERCENT: 55.2 %
PDW BLD-RTO: 16.9 % (ref 12.4–15.4)
PLATELET # BLD: 185 K/UL (ref 135–450)
PMV BLD AUTO: 9.4 FL (ref 5–10.5)
RBC # BLD: 5.4 M/UL (ref 4.2–5.9)
SEDIMENTATION RATE, ERYTHROCYTE: 4 MM/HR (ref 0–20)
WBC # BLD: 5.4 K/UL (ref 4–11)

## 2021-11-01 PROCEDURE — G8484 FLU IMMUNIZE NO ADMIN: HCPCS | Performed by: INTERNAL MEDICINE

## 2021-11-01 PROCEDURE — G8417 CALC BMI ABV UP PARAM F/U: HCPCS | Performed by: INTERNAL MEDICINE

## 2021-11-01 PROCEDURE — 1036F TOBACCO NON-USER: CPT | Performed by: INTERNAL MEDICINE

## 2021-11-01 PROCEDURE — G8427 DOCREV CUR MEDS BY ELIG CLIN: HCPCS | Performed by: INTERNAL MEDICINE

## 2021-11-01 PROCEDURE — 99214 OFFICE O/P EST MOD 30 MIN: CPT | Performed by: INTERNAL MEDICINE

## 2021-11-01 PROCEDURE — 3017F COLORECTAL CA SCREEN DOC REV: CPT | Performed by: INTERNAL MEDICINE

## 2021-11-01 PROCEDURE — 36415 COLL VENOUS BLD VENIPUNCTURE: CPT | Performed by: INTERNAL MEDICINE

## 2021-11-01 NOTE — PROGRESS NOTES
[address], [place]                                                           [address] [place], [place] [address], [address] (b) [phone] (F)      Dear Dr. Dameon Alatorre MD:  Please find Rheumatology assessment. Thank you for giving me the opportunity to be involved in [address] [place] [address] care and I look forward following Sydni Pascual along with you. If you have any questions or concerns please feel free to reach me. Note is transcribed using voice recognition software. Inadvertent computerized transcription errors may be present. Primary provider: Dameon Alatorre MD  Patient identification: Saulo Valdivia: [birth year],31 y.o. Sex: male     Assessment:      Sydni Pascual was seen today for follow-up. Diagnoses and all orders for this visit:    Psoriatic arthritis ([address]    Psoriasis    Generalized osteoarthritis    High risk medication use  -     AST(SGOT) & ALT(SGPT)  -     CBC Auto Differential  -     C-Reactive Protein  -     Sedimentation Rate  -     Creatinine, Serum        Skin psoriasis and psoriatic arthritis-onset 2016. Today's visit-  Psoriasis - a few lesions in her face, using topical coal tar preparation. Psoriatic arthritis-in remission on Humira 40 mg every 14 days. Knee osteoarthritis and CMC OA-getting worse over time, utilizes acetaminophen and diclofenac gel. Followed by orthopedics, has bone-on-bone osteoarthritis, is not ready for surgical intervention. Plan-check safety labs as above. Prescriptions updated. Continue current treatment plan. We discussed about the benefit of weight loss, would perhaps help to some extent with knee osteoarthritis.   He is aware of it, and is working on it. call us with any worsening symptoms, follow-up in 3 months. Patient indicates understanding and agrees with the management plan. I reviewed patient's history, referral documents and electronic medical records. #######################################################################    Ixbprwkyyt-kwwpaj-gg for psoriasis, psoriatic arthritis and osteoarthritis. Interval changes-   He has been having a few areas outbreaks of psoriasis in his face especially around his mustache, has been using tar preparation that helps. Continues to have pain at the base of the thumbs and knees, uses diclofenac and acetaminophen. Otherwise doing well. No overt flares. Knees continue to bother him, has advanced osteoarthritis. Normal ADLs and recreational activities. All other review of systems are negative. PMH/PSH/FH/PS/ MEDS reviewed and updated as appropriate. Father - Psorisis    Current Outpatient Medications   Medication Sig Dispense Refill    adalimumab (HUMIRA PEN) 40 MG/0.8ML injection Inject 40 mg sc Q 14 days 2 each 4    diclofenac sodium (VOLTAREN) 1 % GEL Apply 2 g topically 3 times daily as needed for Pain 1 Tube 0    amLODIPine (NORVASC) 10 MG tablet Take 10 mg by mouth daily      HUMIRA PEN 40 MG/0.8ML injection INJECT ONE PEN SUBCUTANEOUSLY EVERY OTHER WEEK. REFRIGERATE. 2 each 2    diclofenac (PENNSAID) 2 % SOLN Plan to painful area on knee twice daily as needed for pain. Do not use with ibuprofen or any other NSAIDs orally 1 Bottle 1    methocarbamol (ROBAXIN) 500 MG tablet Take 1 tab po TID 90 tablet 2    HYDROcodone-acetaminophen (NORCO) 5-325 MG per tablet Take 1 tab po TID/prn. 21 tablet 0    folic acid (FOLVITE) 1 MG tablet Take 1 tablet by mouth daily Take 1 tab po daily. 90 tablet 4     No current facility-administered medications for this visit.      Allergies   Allergen Reactions    Shellfish-Derived Products Anaphylaxis       PHYSICAL EXAM:    Vitals:    /80   Ht 5' 10\" (1.778 m)   Wt 218 lb (98.9 kg)   BMI 31.28 kg/m²   General appearance: alert, appears stated age and cooperative. MKS:   28 joint JOINT COUNT:                               Right                   Left   Swell Tender Swell Tender   PIP1           PIP2        PIP3        PIP4          PIP5          MCP1           MCP2        MCP3        MCP4           MCP5           Wrist           Elbow           Shoulder          knee            OA changes at the base of his thumbs CMC joints as well as knees. No focally tender, swollen or inflamed joints in upper or lower extremities. No dactylitis or enthesitis. Skin: A few fine scaly lesions in his face-mustache line  Normal effort of breathing.     DATA:   Lab Results   Component Value Date    WBC 6.4 10/26/2020    HGB 15.6 10/26/2020    HCT 47.7 10/26/2020    MCV 89.6 10/26/2020     10/26/2020         Chemistry        Component Value Date/Time     10/26/2020 1028    K 4.3 10/26/2020 1028     10/26/2020 1028    CO2 24 10/26/2020 1028    BUN 11 10/26/2020 1028    CREATININE 0.6 (L) 10/26/2020 1028        Component Value Date/Time    CALCIUM 8.8 10/26/2020 1028    ALKPHOS 62 10/26/2020 1028    AST 14 (L) 10/26/2020 1028    ALT 16 10/26/2020 1028    BILITOT 0.5 10/26/2020 1028            Lab Results   Component Value Date    CRP 1.9 10/26/2020     Lab Results   Component Value Date    SUPA Negative 10/10/2016    SEDRATE 4 10/26/2020     No results found for: CKTOTAL  Lab Results   Component Value Date    TSH 5.84 (H) 10/10/2016          Total time 33 minutes that includes the following-  Preparing to see the patient such as reviewing patients records, pre-charting, preparing the visit on the same day, performing a medically appropriate history and physical examination, counseling and educating patient about diagnosis, management plan, ordering appropriate testings, prescriptions, communicating findings to other care providers, and documenting clinical information in electronic medical record. A/P- See above.

## 2022-02-01 ENCOUNTER — VIRTUAL VISIT (OUTPATIENT)
Dept: RHEUMATOLOGY | Age: 67
End: 2022-02-01
Payer: MEDICAID

## 2022-02-01 DIAGNOSIS — L40.50 PSORIATIC ARTHRITIS (HCC): Primary | ICD-10-CM

## 2022-02-01 DIAGNOSIS — M15.9 GENERALIZED OSTEOARTHRITIS: ICD-10-CM

## 2022-02-01 DIAGNOSIS — Z79.899 HIGH RISK MEDICATION USE: ICD-10-CM

## 2022-02-01 DIAGNOSIS — L40.9 PSORIASIS: ICD-10-CM

## 2022-02-01 PROCEDURE — G8428 CUR MEDS NOT DOCUMENT: HCPCS | Performed by: INTERNAL MEDICINE

## 2022-02-01 PROCEDURE — 3017F COLORECTAL CA SCREEN DOC REV: CPT | Performed by: INTERNAL MEDICINE

## 2022-02-01 PROCEDURE — 99214 OFFICE O/P EST MOD 30 MIN: CPT | Performed by: INTERNAL MEDICINE

## 2022-02-01 NOTE — PROGRESS NOTES
[address], [place]                                                           [address] [place], [place] [address], [address] (V) [phone] (F)      Dear Dr. Amol Aquino MD:  Please find Rheumatology assessment. Thank you for giving me the opportunity to be involved in [address] [place] [address] care and I look forward following Zachary Pierre along with you. If you have any questions or concerns please feel free to reach me. Note is transcribed using voice recognition software. Inadvertent computerized transcription errors may be present. Primary provider: Amol Aquino MD  Patient identification: Tedd Kocher: [birth year],13 y.o. Sex: male     Assessment:      Zachary Pierre was seen today for follow-up. Diagnoses and all orders for this visit:    Psoriatic arthritis ([facility] [address]    Psoriasis    Generalized osteoarthritis        Skin psoriasis and psoriatic arthritis-onset 2016. Today's visit-  Psoriasis and psoriatic arthritis in remission on Humira 40 mg every other week. Knee osteoarthritis and CMC OA-symptomatic, pain is somewhat manageable on diclofenac gel and acetaminophen. Followed by orthopedics, has bone-on-bone osteoarthritis, is not ready for surgical intervention. Plan-  Check safety labs, prescription authorized. Call with any flares of symptoms. Follow-up in 3 months. Patient indicates understanding and agrees with the management plan. I reviewed patient's history, referral documents and electronic medical records. #######################################################################    Dwpdvnzlsp-lcrnfk-ib for psoriasis, psoriatic arthritis and osteoarthritis.      Interval changes-   He is doing fairly well in terms of psoriasis and arthritis. Continues to have discomfort in his knees and thumbs from osteoarthritis. Tolerating medications well. No overt flares. Normal ADLs and recreational activities. All other review of systems are negative. PMH/PSH/FH/PS/ MEDS reviewed and updated as appropriate. Father - Psorisis    Current Outpatient Medications   Medication Sig Dispense Refill    adalimumab (HUMIRA PEN) 40 MG/0.8ML injection INJECT 1 PEN UNDER THE SKIN EVERY 14 DAYS 2 each 2    diclofenac sodium (VOLTAREN) 1 % GEL Apply 2 g topically 3 times daily as needed for Pain 1 Tube 0    amLODIPine (NORVASC) 10 MG tablet Take 10 mg by mouth daily      HUMIRA PEN 40 MG/0.8ML injection INJECT ONE PEN SUBCUTANEOUSLY EVERY OTHER WEEK. REFRIGERATE. 2 each 2    diclofenac (PENNSAID) 2 % SOLN Plan to painful area on knee twice daily as needed for pain. Do not use with ibuprofen or any other NSAIDs orally 1 Bottle 1    methocarbamol (ROBAXIN) 500 MG tablet Take 1 tab po TID 90 tablet 2    HYDROcodone-acetaminophen (NORCO) 5-325 MG per tablet Take 1 tab po TID/prn. 21 tablet 0    folic acid (FOLVITE) 1 MG tablet Take 1 tablet by mouth daily Take 1 tab po daily. 90 tablet 4     No current facility-administered medications for this visit. Allergies   Allergen Reactions    Shellfish-Derived Products Anaphylaxis       PHYSICAL EXAM:    Vitals: There were no vitals taken for this visit. General appearance: alert, appears stated age and cooperative. MKS:   28 joint JOINT COUNT:                               Right                   Left   Swell Tender Swell Tender   PIP1           PIP2        PIP3        PIP4          PIP5          MCP1           MCP2        MCP3        MCP4           MCP5           Wrist           Elbow           Shoulder          knee            No findings to be seen in [facility]. No rashes. Normal effort of breathing.     DATA:   Lab Results   Component Value Date    WBC 5.4 11/01/2021    HGB 14.1 11/01/2021    HCT 43.5 11/01/2021    MCV 80.6 11/01/2021     11/01/2021         Chemistry        Component Value Date/Time     10/26/2020 1028    K 4.3 10/26/2020 1028     10/26/2020 1028    CO2 24 10/26/2020 1028    BUN 11 10/26/2020 1028    CREATININE 0.7 (L) 11/01/2021 0800        Component Value Date/Time    CALCIUM 8.8 10/26/2020 1028    ALKPHOS 62 10/26/2020 1028    AST 22 11/01/2021 0800    ALT 26 11/01/2021 0800    BILITOT 0.5 10/26/2020 1028            Lab Results   Component Value Date    CRP 3.5 11/01/2021     Lab Results   Component Value Date    SUPA Negative 10/10/2016    SEDRATE 4 11/01/2021     No results found for: CKTOTAL  Lab Results   Component Value Date    TSH 5.84 (H) 10/10/2016          Total time 30 minutes that includes the following-  Preparing to see the patient such as reviewing patients records, pre-charting, preparing the visit on the same day, performing a medically appropriate history and physical examination, counseling and educating patient about diagnosis, management plan, ordering appropriate testings, prescriptions, communicating findings to other care providers, and documenting clinical information in electronic medical recordDamon Valdovinos, was evaluated through a synchronous (real-time) audio-video encounter. The patient (or guardian if applicable) is aware that this is a billable service, which includes applicable co-pays. This Virtual Visit was conducted with patient's (and/or legal guardian's) consent. The visit was conducted pursuant to the emergency declaration under the [facility], [address] [place] [address] authority and the [email or web address] and [employer] General Act. Patient identification was verified, and a caregiver was present when appropriate. The patient was located at home in a state where the provider was licensed to provide care.       --Paco Dawn MD on 2/1/2022 at 11:00 AM    An electronic signature was used to authenticate this note. A/P- See above.

## 2022-02-22 DIAGNOSIS — Z79.899 HIGH RISK MEDICATION USE: ICD-10-CM

## 2022-02-22 LAB
A/G RATIO: 1.9 (ref 1.1–2.2)
ALBUMIN SERPL-MCNC: 4.1 G/DL (ref 3.4–5)
ALP BLD-CCNC: 62 U/L (ref 40–129)
ALT SERPL-CCNC: 17 U/L (ref 10–40)
ANION GAP SERPL CALCULATED.3IONS-SCNC: 18 MMOL/L (ref 3–16)
AST SERPL-CCNC: 16 U/L (ref 15–37)
BASOPHILS ABSOLUTE: 0 K/UL (ref 0–0.2)
BASOPHILS RELATIVE PERCENT: 0.9 %
BILIRUB SERPL-MCNC: 0.6 MG/DL (ref 0–1)
BUN BLDV-MCNC: 12 MG/DL (ref 7–20)
C-REACTIVE PROTEIN: 5 MG/L (ref 0–5.1)
CALCIUM SERPL-MCNC: 8.9 MG/DL (ref 8.3–10.6)
CHLORIDE BLD-SCNC: 102 MMOL/L (ref 99–110)
CO2: 19 MMOL/L (ref 21–32)
CREAT SERPL-MCNC: 0.8 MG/DL (ref 0.8–1.3)
EOSINOPHILS ABSOLUTE: 0.2 K/UL (ref 0–0.6)
EOSINOPHILS RELATIVE PERCENT: 4.9 %
GFR AFRICAN AMERICAN: >60
GFR NON-AFRICAN AMERICAN: >60
GLUCOSE BLD-MCNC: 121 MG/DL (ref 70–99)
HCT VFR BLD CALC: 41.6 % (ref 40.5–52.5)
HEMOGLOBIN: 13.6 G/DL (ref 13.5–17.5)
LYMPHOCYTES ABSOLUTE: 1.3 K/UL (ref 1–5.1)
LYMPHOCYTES RELATIVE PERCENT: 31.9 %
MCH RBC QN AUTO: 26.9 PG (ref 26–34)
MCHC RBC AUTO-ENTMCNC: 32.8 G/DL (ref 31–36)
MCV RBC AUTO: 81.9 FL (ref 80–100)
MONOCYTES ABSOLUTE: 0.4 K/UL (ref 0–1.3)
MONOCYTES RELATIVE PERCENT: 8.9 %
NEUTROPHILS ABSOLUTE: 2.2 K/UL (ref 1.7–7.7)
NEUTROPHILS RELATIVE PERCENT: 53.4 %
PDW BLD-RTO: 15.8 % (ref 12.4–15.4)
PLATELET # BLD: 164 K/UL (ref 135–450)
PMV BLD AUTO: 9.3 FL (ref 5–10.5)
POTASSIUM SERPL-SCNC: 4 MMOL/L (ref 3.5–5.1)
RBC # BLD: 5.08 M/UL (ref 4.2–5.9)
SEDIMENTATION RATE, ERYTHROCYTE: 13 MM/HR (ref 0–20)
SODIUM BLD-SCNC: 139 MMOL/L (ref 136–145)
TOTAL PROTEIN: 6.3 G/DL (ref 6.4–8.2)
WBC # BLD: 4.1 K/UL (ref 4–11)

## 2022-02-22 PROCEDURE — 36415 COLL VENOUS BLD VENIPUNCTURE: CPT | Performed by: INTERNAL MEDICINE

## 2022-06-02 ENCOUNTER — OFFICE VISIT (OUTPATIENT)
Dept: RHEUMATOLOGY | Age: 67
End: 2022-06-02
Payer: MEDICAID

## 2022-06-02 VITALS
WEIGHT: 200 LBS | DIASTOLIC BLOOD PRESSURE: 76 MMHG | HEIGHT: 70 IN | SYSTOLIC BLOOD PRESSURE: 148 MMHG | BODY MASS INDEX: 28.63 KG/M2

## 2022-06-02 DIAGNOSIS — L40.9 PSORIASIS: Primary | ICD-10-CM

## 2022-06-02 DIAGNOSIS — L40.50 PSORIATIC ARTHROPATHY (HCC): ICD-10-CM

## 2022-06-02 DIAGNOSIS — Z79.899 HIGH RISK MEDICATION USE: ICD-10-CM

## 2022-06-02 DIAGNOSIS — M54.40 ACUTE BILATERAL LOW BACK PAIN WITH SCIATICA, SCIATICA LATERALITY UNSPECIFIED: ICD-10-CM

## 2022-06-02 DIAGNOSIS — M15.9 GENERALIZED OSTEOARTHRITIS: ICD-10-CM

## 2022-06-02 PROCEDURE — 99214 OFFICE O/P EST MOD 30 MIN: CPT | Performed by: INTERNAL MEDICINE

## 2022-06-02 PROCEDURE — G8427 DOCREV CUR MEDS BY ELIG CLIN: HCPCS | Performed by: INTERNAL MEDICINE

## 2022-06-02 PROCEDURE — 1036F TOBACCO NON-USER: CPT | Performed by: INTERNAL MEDICINE

## 2022-06-02 PROCEDURE — G8417 CALC BMI ABV UP PARAM F/U: HCPCS | Performed by: INTERNAL MEDICINE

## 2022-06-02 PROCEDURE — 3017F COLORECTAL CA SCREEN DOC REV: CPT | Performed by: INTERNAL MEDICINE

## 2022-06-02 RX ORDER — METHOCARBAMOL 500 MG/1
TABLET, FILM COATED ORAL
Qty: 30 TABLET | Refills: 0 | Status: SHIPPED | OUTPATIENT
Start: 2022-06-02

## 2022-06-02 NOTE — PROGRESS NOTES
[address], [place]                                                           [address] [place] [address] Stacie Brunson 53, [address] (o) [phone] (F)      Dear Dr. Sandeep Cobb MD:  Please find Rheumatology assessment. Thank you for giving me the opportunity to be involved in [address] [place] [address] care and I look forward following Kera Thompson along with you. If you have any questions or concerns please feel free to reach me. Note is transcribed using voice recognition software. Inadvertent computerized transcription errors may be present. Primary provider: Sandeep Cobb MD  Patient identification: Xena Ford: [id number],64 y.o. Sex: male     Assessment:      Kera Thompson was seen today for follow-up. Diagnoses and all orders for this visit:    Psoriasis    [facility]  -     adalimumab (HUMIRA PEN) 40 MG/0.8ML injection; INJECT ONE PEN SUBCUTANEOUSLY EVERY 10 days. REFRIGERATE. High risk medication use    Generalized osteoarthritis    Acute bilateral low back pain with sciatica, sciatica laterality unspecified    Other orders  -     methocarbamol (ROBAXIN) 500 MG tablet; Take 1 tab po TID/prn        Skin psoriasis and psoriatic arthritis-onset . In remission on Humira 40 mg every 10 days. Safety labs are normal.  Prescription authorized. Knee osteoarthritis and CMC OA-symptomatic, pain is manageable with over-the-counter NSAID and diclofenac gel. Acute low back pain-likely discogenic while lifting heavy object. Continue acetaminophen or ibuprofen every 6-8 hourly, add muscle relaxants as needed. Patient indicates understanding and agrees with the management plan.   I reviewed patient's history, referral documents and electronic medical records. #######################################################################    Qpyhpafkvo-armlql-ax for psoriasis, psoriatic arthritis and osteoarthritis. Interval changes-   States that he was lifting a heavy object earlier this week, and his back gave out. Lower thoracic upper lumbar pain, no radiculopathic symptoms. Doing well in terms of psoriasis and psoriatic arthritis. Knees continue to bother from osteoarthritis. No adverse events from medications. Normal ADLs and recreational activities. PMH/PSH/FH/PS/ MEDS reviewed and updated as appropriate. Father - Psorisis    Current Outpatient Medications   Medication Sig Dispense Refill    adalimumab (HUMIRA PEN) 40 MG/0.8ML injection INJECT ONE PEN SUBCUTANEOUSLY EVERY 10 days. REFRIGERATE. 3 each 3    methocarbamol (ROBAXIN) 500 MG tablet Take 1 tab po TID/prn 30 tablet 0    adalimumab (HUMIRA PEN) 40 MG/0.8ML injection INJECT 1 PEN UNDER THE SKIN EVERY 14 DAYS 2 each 2    diclofenac sodium (VOLTAREN) 1 % GEL Apply 2 g topically 3 times daily as needed for Pain 1 Tube 0    amLODIPine (NORVASC) 10 MG tablet Take 10 mg by mouth daily      diclofenac (PENNSAID) 2 % SOLN Plan to painful area on knee twice daily as needed for pain. Do not use with ibuprofen or any other NSAIDs orally 1 Bottle 1    methocarbamol (ROBAXIN) 500 MG tablet Take 1 tab po TID 90 tablet 2    HYDROcodone-acetaminophen (NORCO) 5-325 MG per tablet Take 1 tab po TID/prn. 21 tablet 0    folic acid (FOLVITE) 1 MG tablet Take 1 tablet by mouth daily Take 1 tab po daily. 90 tablet 4     No current facility-administered medications for this visit. Allergies   Allergen Reactions    Shellfish-Derived Products Anaphylaxis       PHYSICAL EXAM:    Vitals:    BP (!) 148/76   Ht 5' 10\" (1.778 m)   Wt 200 lb (90.7 kg)   BMI 28.70 kg/m²   General appearance: alert, appears stated age and cooperative.    MKS:   28 joint JOINT COUNT: Right                   Left   Swell Tender Swell Tender   PIP1           PIP2        PIP3        PIP4          PIP5          MCP1           MCP2        MCP3        MCP4           MCP5           Wrist           Elbow           Shoulder          knee            Normal spinal alignment without any focal tenderness. SLR negative. Subjective lower lumbar, thoracic discomfort. No appreciable tender, swollen or inflamed joints in upper or lower extremities. No dactylitis, enthesitis. No rashes. Normal effort of breathing. DATA:   Lab Results   Component Value Date    WBC 4.1 02/22/2022    HGB 13.6 02/22/2022    HCT 41.6 02/22/2022    MCV 81.9 02/22/2022     02/22/2022         Chemistry        Component Value Date/Time     02/22/2022 1031    K 4.0 02/22/2022 1031     02/22/2022 1031    CO2 19 (L) 02/22/2022 1031    BUN 12 02/22/2022 1031    CREATININE 0.8 02/22/2022 1031        Component Value Date/Time    CALCIUM 8.9 02/22/2022 1031    ALKPHOS 62 02/22/2022 1031    AST 16 02/22/2022 1031    ALT 17 02/22/2022 1031    BILITOT 0.6 02/22/2022 1031            Lab Results   Component Value Date    CRP 5.0 02/22/2022     Lab Results   Component Value Date    SUPA Negative 10/10/2016    SEDRATE 13 02/22/2022     No results found for: CKTOTAL  Lab Results   Component Value Date    TSH 5.84 (H) 10/10/2016          Total time 32 minutes that includes the following-  Preparing to see the patient such as reviewing patients records, pre-charting, preparing the visit on the same day, performing a medically appropriate history and physical examination, counseling and educating patient about diagnosis, management plan, ordering appropriate testings, prescriptions, communicating findings to other care providers, and documenting clinical information in electronic medical record.         --Nikki Kelly MD on 6/2/2022 at 1:21 PM    An electronic signature was used to authenticate this note.          A/P- See above.

## 2022-06-27 ENCOUNTER — OFFICE VISIT (OUTPATIENT)
Dept: RHEUMATOLOGY | Age: 67
End: 2022-06-27
Payer: MEDICAID

## 2022-06-27 VITALS
HEIGHT: 70 IN | WEIGHT: 200 LBS | BODY MASS INDEX: 28.63 KG/M2 | DIASTOLIC BLOOD PRESSURE: 67 MMHG | SYSTOLIC BLOOD PRESSURE: 110 MMHG

## 2022-06-27 DIAGNOSIS — L40.9 PSORIASIS: ICD-10-CM

## 2022-06-27 DIAGNOSIS — L40.50 PSORIATIC ARTHROPATHY (HCC): Primary | ICD-10-CM

## 2022-06-27 DIAGNOSIS — M15.9 GENERALIZED OSTEOARTHRITIS: ICD-10-CM

## 2022-06-27 DIAGNOSIS — Z79.899 HIGH RISK MEDICATION USE: ICD-10-CM

## 2022-06-27 PROCEDURE — 3017F COLORECTAL CA SCREEN DOC REV: CPT | Performed by: INTERNAL MEDICINE

## 2022-06-27 PROCEDURE — G8417 CALC BMI ABV UP PARAM F/U: HCPCS | Performed by: INTERNAL MEDICINE

## 2022-06-27 PROCEDURE — G8427 DOCREV CUR MEDS BY ELIG CLIN: HCPCS | Performed by: INTERNAL MEDICINE

## 2022-06-27 PROCEDURE — 1036F TOBACCO NON-USER: CPT | Performed by: INTERNAL MEDICINE

## 2022-06-27 PROCEDURE — 99214 OFFICE O/P EST MOD 30 MIN: CPT | Performed by: INTERNAL MEDICINE

## 2022-06-27 RX ORDER — DULOXETIN HYDROCHLORIDE 30 MG/1
30 CAPSULE, DELAYED RELEASE ORAL DAILY
Qty: 30 CAPSULE | Refills: 2 | Status: SHIPPED | OUTPATIENT
Start: 2022-06-27 | End: 2022-07-18 | Stop reason: SDUPTHER

## 2022-06-27 NOTE — PROGRESS NOTES
psoriasis, psoriatic arthritis and osteoarthritis. Interval changes-   Other than discomfort at [address] joints, knees, spasm in mid back, he is doing well in terms of psoriasis and psoriatic arthritis on Humira. ADLs are at times affected by noninflammatory pain. Tolerating medications well. Diclofenac gel, oral diclofenac, muscle relaxant provides some relief in his symptoms. Denies any infections or side effects from Humira. PMH/PSH/FH/PS/ MEDS reviewed and updated as appropriate. Father - Psorisis    Current Outpatient Medications   Medication Sig Dispense Refill    DULoxetine (CYMBALTA) 30 MG extended release capsule Take 1 capsule by mouth daily 30 capsule 2    adalimumab (HUMIRA PEN) 40 MG/0.8ML injection INJECT ONE PEN SUBCUTANEOUSLY EVERY 10 days. REFRIGERATE. 3 each 3    methocarbamol (ROBAXIN) 500 MG tablet Take 1 tab po TID/prn 30 tablet 0    adalimumab (HUMIRA PEN) 40 MG/0.8ML injection INJECT 1 PEN UNDER THE SKIN EVERY 14 DAYS 2 each 2    amLODIPine (NORVASC) 10 MG tablet Take 10 mg by mouth daily      diclofenac (PENNSAID) 2 % SOLN Plan to painful area on knee twice daily as needed for pain. Do not use with ibuprofen or any other NSAIDs orally 1 Bottle 1    methocarbamol (ROBAXIN) 500 MG tablet Take 1 tab po TID 90 tablet 2    HYDROcodone-acetaminophen (NORCO) 5-325 MG per tablet Take 1 tab po TID/prn. 21 tablet 0    diclofenac sodium (VOLTAREN) 1 % GEL Apply 2 g topically 3 times daily as needed for Pain 1 Tube 0    folic acid (FOLVITE) 1 MG tablet Take 1 tablet by mouth daily Take 1 tab po daily. 90 tablet 4     No current facility-administered medications for this visit. Allergies   Allergen Reactions    Shellfish-Derived Products Anaphylaxis       PHYSICAL EXAM:    Vitals:    /67   Ht 5' 10\" (1.778 m)   Wt 200 lb (90.7 kg)   BMI 28.70 kg/m²   General appearance: alert, appears stated age and cooperative.    MKS:   28 joint JOINT COUNT: Right                   Left   Swell Tender Swell Tender   PIP1           PIP2        PIP3        PIP4          PIP5          MCP1           MCP2        MCP3        MCP4           MCP5           Wrist           Elbow           Shoulder          knee            Other than OA changes in his CMC joints, knees, and subjective paraspinal thoracic upper lumbar discomfort-  No appreciable tender, swollen or inflamed joints in upper or lower extremities. No dactylitis, enthesitis. No rashes. Normal effort of breathing. DATA:   Lab Results   Component Value Date    WBC 4.1 02/22/2022    HGB 13.6 02/22/2022    HCT 41.6 02/22/2022    MCV 81.9 02/22/2022     02/22/2022         Chemistry        Component Value Date/Time     02/22/2022 1031    K 4.0 02/22/2022 1031     02/22/2022 1031    CO2 19 (L) 02/22/2022 1031    BUN 12 02/22/2022 1031    CREATININE 0.8 02/22/2022 1031        Component Value Date/Time    CALCIUM 8.9 02/22/2022 1031    ALKPHOS 62 02/22/2022 1031    AST 16 02/22/2022 1031    ALT 17 02/22/2022 1031    BILITOT 0.6 02/22/2022 1031            Lab Results   Component Value Date    CRP 5.0 02/22/2022     Lab Results   Component Value Date    SUPA Negative 10/10/2016    SEDRATE 13 02/22/2022     No results found for: CKTOTAL  Lab Results   Component Value Date    TSH 5.84 (H) 10/10/2016          Total time 33 minutes that includes the following-  Preparing to see the patient such as reviewing patients records, pre-charting, preparing the visit on the same day, performing a medically appropriate history and physical examination, counseling and educating patient about diagnosis, management plan, ordering appropriate testings, prescriptions, communicating findings to other care providers, and documenting clinical information in electronic medical record. --Chepe Denny MD on 6/27/2022 at 3:24 PM    An electronic signature was used to authenticate this note.           A/P- See above.

## 2022-07-18 RX ORDER — DULOXETIN HYDROCHLORIDE 30 MG/1
30 CAPSULE, DELAYED RELEASE ORAL 2 TIMES DAILY
Qty: 60 CAPSULE | Refills: 2 | Status: SHIPPED | OUTPATIENT
Start: 2022-07-18

## 2022-07-18 NOTE — TELEPHONE ENCOUNTER
Pt calling to advise you that the 30 mg of Cymbalta helped some what. Pt began taking 1 in the morning and 1 at night which seemed to help him through the entire day of work. Advised pt that 60 mg was available but he stated he liked taking 1 BID 11 hours apart, because that lasted the day. Improvement is about at 50% on this medication. I've pended the 30 mg BID to go to [facility] because script is going to run out and pt is leaving out of town tomorrow morning.

## 2022-08-12 ENCOUNTER — TELEPHONE (OUTPATIENT)
Dept: RHEUMATOLOGY | Age: 67
End: 2022-08-12

## 2022-08-12 DIAGNOSIS — M15.9 GENERALIZED OSTEOARTHRITIS: Primary | ICD-10-CM

## 2022-08-15 RX ORDER — DULOXETIN HYDROCHLORIDE 60 MG/1
60 CAPSULE, DELAYED RELEASE ORAL DAILY
Qty: 30 CAPSULE | Refills: 3 | Status: SHIPPED | OUTPATIENT
Start: 2022-08-15 | End: 2022-09-30

## 2022-09-29 NOTE — PROGRESS NOTES
[address], [place]                                                           [address] [place] [address]                                                             [phone] (P) [phone] (F)         Note is transcribed using voice recognition software. Inadvertent computerized transcription errors may be present. Primary provider: Shruthi Nation MD  Patient identification: Lizbeth Parra: [birth year],5 y.o. Sex: male     Assessment:      Joseph Rutherford was seen today for follow-up. Diagnoses and all orders for this visit:    Psoriatic arthropathy ([address]    Psoriasis    Generalized osteoarthritis    High risk medication use  -     Quantiferon, Incubated; Future    Other orders  -     adalimumab (HUMIRA PEN) 40 MG/0.8ML injection; INJECT 1 PEN UNDER THE SKIN EVERY 14 DAYS      Skin psoriasis and psoriatic arthritis-onset 2016. In remission on Humira 40 mg every 10 days. Safety labs are ordered. Prescription reviewed. 2.  Generalized osteoarthritis-mostly symptomatic in his knee osteoarthritis, DJD spine. Cymbalta 30 mg twice daily is not helping much. Is in the process of scheduling appointment with physiatry-spine/pain management. Advised patient not to combine meloxicam and ibuprofen, as they are the same class of medications. Risk of kidney toxicity and gastric ulcers discussed. Add acetaminophen 650 mg 3 times a day to meloxicam or ibuprofen. Call with any flares, RTC 3 months. Patient indicates understanding and agrees with the management plan. I reviewed patient's history, referral documents and electronic medical records.     #######################################################################    Txzdpbmgyy-skmyyh-ij for psoriasis, psoriatic arthritis and osteoarthritis. Interval changes-   He continues to have pain, stiffness, discomfort in his entire neck, cervical, thoracic and lumbar area as well as knees. Symptoms are worse with repetitive use and weightbearing. No swelling noted in his finger joints. No active psoriasis. Back pain is limiting some of the ADLs and recreational activities, has been using some kind of \"red-ray heat\" therapy  . He has TENS unit, is nonfunctional at this time. He has been combining meloxicam and ibuprofen, states that it does not help. Denies any infections or side effects from Humira. PMH/PSH/FH/PS/ MEDS reviewed and updated as appropriate. Father - Psorisis    Current Outpatient Medications   Medication Sig Dispense Refill    adalimumab (HUMIRA PEN) 40 MG/0.8ML injection INJECT 1 PEN UNDER THE SKIN EVERY 14 DAYS 2 each 3    DULoxetine (CYMBALTA) 30 MG extended release capsule Take 1 capsule by mouth in the morning and 1 capsule before bedtime. 60 capsule 2    methocarbamol (ROBAXIN) 500 MG tablet Take 1 tab po TID/prn 30 tablet 0    amLODIPine (NORVASC) 10 MG tablet Take 10 mg by mouth daily      diclofenac (PENNSAID) 2 % SOLN Plan to painful area on knee twice daily as needed for pain. Do not use with ibuprofen or any other NSAIDs orally 1 Bottle 1    methocarbamol (ROBAXIN) 500 MG tablet Take 1 tab po TID 90 tablet 2    HYDROcodone-acetaminophen (NORCO) 5-325 MG per tablet Take 1 tab po TID/prn. 21 tablet 0    diclofenac sodium (VOLTAREN) 1 % GEL Apply 2 g topically 3 times daily as needed for Pain 1 Tube 0    folic acid (FOLVITE) 1 MG tablet Take 1 tablet by mouth daily Take 1 tab po daily. 90 tablet 4     No current facility-administered medications for this visit. Allergies   Allergen Reactions    Shellfish-Derived Products Anaphylaxis       PHYSICAL EXAM:    Vitals:    Ht 5' 10\" (1.778 m)   Wt 193 lb (87.5 kg)   BMI 27.69 kg/m²   General appearance: alert, appears stated age and cooperative.    MKS: 28 joint JOINT COUNT:                               Right                   Left   Swell Tender Swell Tender   PIP1           PIP2        PIP3        PIP4          PIP5          MCP1           MCP2        MCP3        MCP4           MCP5           Wrist           Elbow           Shoulder          knee            Other than OA changes in his CMC joints, knees, and subjective paraspinal thoracic upper lumbar discomfort-  Musculoskeletal findings are unrevealing for inflammatory arthritis. No appreciable tender, swollen or inflamed joints in upper or lower extremities. No dactylitis, enthesitis. No rashes. Normal effort of breathing. DATA:   Lab Results   Component Value Date    WBC 4.1 02/22/2022    HGB 13.6 02/22/2022    HCT 41.6 02/22/2022    MCV 81.9 02/22/2022     02/22/2022         Chemistry        Component Value Date/Time     02/22/2022 1031    K 4.0 02/22/2022 1031     02/22/2022 1031    CO2 19 (L) 02/22/2022 1031    BUN 12 02/22/2022 1031    CREATININE 0.8 02/22/2022 1031        Component Value Date/Time    CALCIUM 8.9 02/22/2022 1031    ALKPHOS 62 02/22/2022 1031    AST 16 02/22/2022 1031    ALT 17 02/22/2022 1031    BILITOT 0.6 02/22/2022 1031            Lab Results   Component Value Date    CRP 5.0 02/22/2022     Lab Results   Component Value Date    SUPA Negative 10/10/2016    SEDRATE 13 02/22/2022     No results found for: CKTOTAL  Lab Results   Component Value Date    TSH 5.84 (H) 10/10/2016          Total time 31 minutes that includes the following-  Preparing to see the patient such as reviewing patients records, pre-charting, preparing the visit on the same day, performing a medically appropriate history and physical examination, counseling and educating patient about diagnosis, management plan, ordering appropriate testings, prescriptions, communicating findings to other care providers, and documenting clinical information in electronic medical record.         --Mari Sanches Aric Benavides MD on 9/30/2022 at 2:11 PM    An electronic signature was used to authenticate this note. A/P- See above.

## 2022-09-30 ENCOUNTER — OFFICE VISIT (OUTPATIENT)
Dept: RHEUMATOLOGY | Age: 67
End: 2022-09-30
Payer: MEDICAID

## 2022-09-30 VITALS — BODY MASS INDEX: 27.63 KG/M2 | WEIGHT: 193 LBS | HEIGHT: 70 IN

## 2022-09-30 DIAGNOSIS — Z79.899 HIGH RISK MEDICATION USE: ICD-10-CM

## 2022-09-30 DIAGNOSIS — L40.50 PSORIATIC ARTHROPATHY (HCC): Primary | ICD-10-CM

## 2022-09-30 DIAGNOSIS — M15.9 GENERALIZED OSTEOARTHRITIS: ICD-10-CM

## 2022-09-30 DIAGNOSIS — L40.9 PSORIASIS: ICD-10-CM

## 2022-09-30 LAB
A/G RATIO: 1.8 (ref 1.1–2.2)
ALBUMIN SERPL-MCNC: 4.1 G/DL (ref 3.4–5)
ALP BLD-CCNC: 55 U/L (ref 40–129)
ALT SERPL-CCNC: 14 U/L (ref 10–40)
ANION GAP SERPL CALCULATED.3IONS-SCNC: 11 MMOL/L (ref 3–16)
AST SERPL-CCNC: 16 U/L (ref 15–37)
BASOPHILS ABSOLUTE: 0.1 K/UL (ref 0–0.2)
BASOPHILS RELATIVE PERCENT: 1.8 %
BILIRUB SERPL-MCNC: 0.3 MG/DL (ref 0–1)
BUN BLDV-MCNC: 10 MG/DL (ref 7–20)
C-REACTIVE PROTEIN: <3 MG/L (ref 0–5.1)
CALCIUM SERPL-MCNC: 9.2 MG/DL (ref 8.3–10.6)
CHLORIDE BLD-SCNC: 102 MMOL/L (ref 99–110)
CO2: 29 MMOL/L (ref 21–32)
CREAT SERPL-MCNC: 0.9 MG/DL (ref 0.8–1.3)
EOSINOPHILS ABSOLUTE: 0.7 K/UL (ref 0–0.6)
EOSINOPHILS RELATIVE PERCENT: 13.6 %
GFR AFRICAN AMERICAN: >60
GFR NON-AFRICAN AMERICAN: >60
GLUCOSE BLD-MCNC: 74 MG/DL (ref 70–99)
HCT VFR BLD CALC: 44.2 % (ref 40.5–52.5)
HEMOGLOBIN: 14.6 G/DL (ref 13.5–17.5)
LYMPHOCYTES ABSOLUTE: 1.5 K/UL (ref 1–5.1)
LYMPHOCYTES RELATIVE PERCENT: 27.9 %
MCH RBC QN AUTO: 29.3 PG (ref 26–34)
MCHC RBC AUTO-ENTMCNC: 33 G/DL (ref 31–36)
MCV RBC AUTO: 88.9 FL (ref 80–100)
MONOCYTES ABSOLUTE: 0.5 K/UL (ref 0–1.3)
MONOCYTES RELATIVE PERCENT: 8.8 %
NEUTROPHILS ABSOLUTE: 2.6 K/UL (ref 1.7–7.7)
NEUTROPHILS RELATIVE PERCENT: 47.9 %
PDW BLD-RTO: 16.7 % (ref 12.4–15.4)
PLATELET # BLD: 217 K/UL (ref 135–450)
PMV BLD AUTO: 8.6 FL (ref 5–10.5)
POTASSIUM SERPL-SCNC: 4.6 MMOL/L (ref 3.5–5.1)
RBC # BLD: 4.98 M/UL (ref 4.2–5.9)
SEDIMENTATION RATE, ERYTHROCYTE: 8 MM/HR (ref 0–20)
SODIUM BLD-SCNC: 142 MMOL/L (ref 136–145)
TOTAL PROTEIN: 6.4 G/DL (ref 6.4–8.2)
WBC # BLD: 5.4 K/UL (ref 4–11)

## 2022-09-30 PROCEDURE — 1036F TOBACCO NON-USER: CPT | Performed by: INTERNAL MEDICINE

## 2022-09-30 PROCEDURE — G8427 DOCREV CUR MEDS BY ELIG CLIN: HCPCS | Performed by: INTERNAL MEDICINE

## 2022-09-30 PROCEDURE — 3017F COLORECTAL CA SCREEN DOC REV: CPT | Performed by: INTERNAL MEDICINE

## 2022-09-30 PROCEDURE — G8417 CALC BMI ABV UP PARAM F/U: HCPCS | Performed by: INTERNAL MEDICINE

## 2022-09-30 PROCEDURE — 99214 OFFICE O/P EST MOD 30 MIN: CPT | Performed by: INTERNAL MEDICINE

## 2022-10-07 LAB
QUANTI TB GOLD PLUS: NEGATIVE
QUANTI TB1 MINUS NIL: 0.3 IU/ML (ref 0–0.34)
QUANTI TB2 MINUS NIL: 0.28 IU/ML (ref 0–0.34)
QUANTIFERON MITOGEN: 9.34 IU/ML
QUANTIFERON NIL: 0.06 IU/ML

## 2022-11-07 RX ORDER — DULOXETIN HYDROCHLORIDE 60 MG/1
60 CAPSULE, DELAYED RELEASE ORAL DAILY
Qty: 30 CAPSULE | Refills: 5 | Status: SHIPPED | OUTPATIENT
Start: 2022-11-07

## 2022-11-07 NOTE — TELEPHONE ENCOUNTER
Pt called requesting refill on medication. Is leaving to go out of town for work (5 days).  Rx has been pended for approval     Last OV 9/9/22  Future OV 1/9/23  Recent Labs 9/30/22

## 2023-02-06 ENCOUNTER — OFFICE VISIT (OUTPATIENT)
Dept: RHEUMATOLOGY | Age: 68
End: 2023-02-06
Payer: MEDICAID

## 2023-02-06 VITALS
DIASTOLIC BLOOD PRESSURE: 80 MMHG | HEIGHT: 70 IN | SYSTOLIC BLOOD PRESSURE: 132 MMHG | WEIGHT: 197 LBS | BODY MASS INDEX: 28.2 KG/M2

## 2023-02-06 DIAGNOSIS — M15.9 GENERALIZED OSTEOARTHRITIS: ICD-10-CM

## 2023-02-06 DIAGNOSIS — L40.50 PSORIATIC ARTHROPATHY (HCC): Primary | ICD-10-CM

## 2023-02-06 DIAGNOSIS — L40.9 PSORIASIS: ICD-10-CM

## 2023-02-06 DIAGNOSIS — Z79.899 HIGH RISK MEDICATION USE: ICD-10-CM

## 2023-02-06 PROCEDURE — 1123F ACP DISCUSS/DSCN MKR DOCD: CPT | Performed by: INTERNAL MEDICINE

## 2023-02-06 PROCEDURE — 99214 OFFICE O/P EST MOD 30 MIN: CPT | Performed by: INTERNAL MEDICINE

## 2023-02-06 NOTE — PROGRESS NOTES
[address], [place]                                                           [address] [place], [place] [address] [phone] (P) [phone] (F)         Note is transcribed using voice recognition software. Inadvertent computerized transcription errors may be present. Primary provider: Shi Simon MD  Patient identification: Peggy Rodriguezt: [birth year],40 y.o. Sex: male     Assessment:      Jorge Palma was seen today for follow-up. Diagnoses and all orders for this visit:    Psoriatic arthropathy ([facility] [address]    Psoriasis    Generalized osteoarthritis    High risk medication use      Skin psoriasis and psoriatic arthritis-onset 2016. In remission on Humira 40 mg every 10 days. Does not have active medical insurance, last Humira injection was 20 days ago. We do not have samples today to give it to him. Advised patient to contact us once he gets insurance information. We will be happy to stop eating. 2.  Generalized osteoarthritis-mostly symptomatic in his knee osteoarthritis, DJD spine. On acetaminophen as needed. Unclear if he is using Cymbalta. Patient indicates understanding and agrees with the management plan. I reviewed patient's history, referral documents and electronic medical records. #######################################################################    Zheclvxmuj-[id number] for psoriasis, psoriatic arthritis and osteoarthritis. Interval changes-   States that he continues to have pain and discomfort in his knees, lower back otherwise doing fairly well in terms of psoriasis and psoriatic arthritis. Ran out of Humira 20 days ago, does not have active medical insurance today.   He is requesting Humira sample. ADLs and depression activities are manageable. No side effects from medications. PMH/PSH/FH/PS/ MEDS reviewed and updated as appropriate. Father - Psorisis    Current Outpatient Medications   Medication Sig Dispense Refill    DULoxetine (CYMBALTA) 60 MG extended release capsule Take 1 capsule by mouth daily 30 capsule 5    adalimumab (HUMIRA PEN) 40 MG/0.8ML injection INJECT 1 PEN UNDER THE SKIN EVERY 14 DAYS 2 each 3    methocarbamol (ROBAXIN) 500 MG tablet Take 1 tab po TID/prn 30 tablet 0    amLODIPine (NORVASC) 10 MG tablet Take 10 mg by mouth daily      diclofenac (PENNSAID) 2 % SOLN Plan to painful area on knee twice daily as needed for pain. Do not use with ibuprofen or any other NSAIDs orally 1 Bottle 1    methocarbamol (ROBAXIN) 500 MG tablet Take 1 tab po TID 90 tablet 2    HYDROcodone-acetaminophen (NORCO) 5-325 MG per tablet Take 1 tab po TID/prn. 21 tablet 0    diclofenac sodium (VOLTAREN) 1 % GEL Apply 2 g topically 3 times daily as needed for Pain 1 Tube 0    folic acid (FOLVITE) 1 MG tablet Take 1 tablet by mouth daily Take 1 tab po daily. 90 tablet 4     No current facility-administered medications for this visit. Allergies   Allergen Reactions    Shellfish-Derived Products Anaphylaxis       PHYSICAL EXAM:    Vitals:    /80   Ht 5' 10\" (1.778 m)   Wt 197 lb (89.4 kg)   BMI 28.27 kg/m²   General appearance: alert, appears stated age and cooperative.    MKS:   28 joint JOINT COUNT:                               Right                   Left   Swell Tender Swell Tender   PIP1           PIP2        PIP3        PIP4          PIP5          MCP1           MCP2        MCP3        MCP4           MCP5           Wrist           Elbow           Shoulder          knee         Do not appreciate any active inflammatory joint findings in the upper or lower extremities by history or exam.  Other than OA changes in his CMC joints, knees, and subjective paraspinal thoracic upper lumbar discomfort-  Normal effort of breathing. DATA:   Lab Results   Component Value Date    WBC 5.4 09/30/2022    HGB 14.6 09/30/2022    HCT 44.2 09/30/2022    MCV 88.9 09/30/2022     09/30/2022         Chemistry        Component Value Date/Time     09/30/2022 1139    K 4.6 09/30/2022 1139     09/30/2022 1139    CO2 29 09/30/2022 1139    BUN 10 09/30/2022 1139    CREATININE 0.9 09/30/2022 1139        Component Value Date/Time    CALCIUM 9.2 09/30/2022 1139    ALKPHOS 55 09/30/2022 1139    AST 16 09/30/2022 1139    ALT 14 09/30/2022 1139    BILITOT 0.3 09/30/2022 1139            Lab Results   Component Value Date    CRP <3.0 09/30/2022     Lab Results   Component Value Date    SUPA Negative 10/10/2016    SEDRATE 8 09/30/2022     No results found for: CKTOTAL  Lab Results   Component Value Date    TSH 5.84 (H) 10/10/2016          Total time 31 minutes that includes the following-  Preparing to see the patient such as reviewing patients records, pre-charting, preparing the visit on the same day, performing a medically appropriate history and physical examination, counseling and educating patient about diagnosis, management plan, ordering appropriate testings, prescriptions, communicating findings to other care providers, and documenting clinical information in electronic medical record. --Lisy Horn MD on 2/6/2023 at 8:55 AM    An electronic signature was used to authenticate this note. A/P- See above.

## 2023-07-10 RX ORDER — DULOXETIN HYDROCHLORIDE 60 MG/1
CAPSULE, DELAYED RELEASE ORAL
Qty: 30 CAPSULE | Refills: 3 | OUTPATIENT
Start: 2023-07-10

## 2023-07-10 NOTE — TELEPHONE ENCOUNTER
Flex Vallecillo is available at her new location to address any patient needs. Directed pharmacy to reach out to the new location for regarding this refill.